# Patient Record
Sex: FEMALE | Race: WHITE | Employment: OTHER | URBAN - METROPOLITAN AREA
[De-identification: names, ages, dates, MRNs, and addresses within clinical notes are randomized per-mention and may not be internally consistent; named-entity substitution may affect disease eponyms.]

---

## 2017-05-17 ENCOUNTER — HOSPITAL ENCOUNTER (OUTPATIENT)
Age: 67
Setting detail: SPECIMEN
Discharge: HOME | End: 2017-05-17
Payer: MEDICARE

## 2017-05-17 ENCOUNTER — OFFICE VISIT (OUTPATIENT)
Dept: FAMILY MEDICINE CLINIC | Age: 67
End: 2017-05-17
Payer: MEDICARE

## 2017-05-17 VITALS
RESPIRATION RATE: 16 BRPM | DIASTOLIC BLOOD PRESSURE: 76 MMHG | HEIGHT: 63 IN | TEMPERATURE: 98.1 F | SYSTOLIC BLOOD PRESSURE: 114 MMHG | WEIGHT: 144 LBS | HEART RATE: 74 BPM | BODY MASS INDEX: 25.52 KG/M2

## 2017-05-17 DIAGNOSIS — Z12.12 SCREENING FOR COLORECTAL CANCER: ICD-10-CM

## 2017-05-17 DIAGNOSIS — E55.9 VITAMIN D DEFICIENCY: ICD-10-CM

## 2017-05-17 DIAGNOSIS — Z11.59 NEED FOR HEPATITIS C SCREENING TEST: ICD-10-CM

## 2017-05-17 DIAGNOSIS — Z85.3 HISTORY OF BREAST CANCER IN FEMALE: ICD-10-CM

## 2017-05-17 DIAGNOSIS — E03.9 HYPOTHYROIDISM, UNSPECIFIED TYPE: Chronic | ICD-10-CM

## 2017-05-17 DIAGNOSIS — R51.9 CHRONIC INTRACTABLE HEADACHE, UNSPECIFIED HEADACHE TYPE: Primary | ICD-10-CM

## 2017-05-17 DIAGNOSIS — E78.5 HYPERLIPIDEMIA, UNSPECIFIED HYPERLIPIDEMIA TYPE: Chronic | ICD-10-CM

## 2017-05-17 DIAGNOSIS — G89.29 CHRONIC INTRACTABLE HEADACHE, UNSPECIFIED HEADACHE TYPE: Primary | ICD-10-CM

## 2017-05-17 DIAGNOSIS — H53.9 VISUAL DISTURBANCE: ICD-10-CM

## 2017-05-17 DIAGNOSIS — G89.29 CHRONIC INTRACTABLE HEADACHE, UNSPECIFIED HEADACHE TYPE: ICD-10-CM

## 2017-05-17 DIAGNOSIS — H53.421 SCOTOMA OF BLIND SPOT AREA OF RIGHT EYE: ICD-10-CM

## 2017-05-17 DIAGNOSIS — Z72.0 TOBACCO ABUSE: ICD-10-CM

## 2017-05-17 DIAGNOSIS — R51.9 CHRONIC INTRACTABLE HEADACHE, UNSPECIFIED HEADACHE TYPE: ICD-10-CM

## 2017-05-17 DIAGNOSIS — Z12.11 SCREENING FOR COLORECTAL CANCER: ICD-10-CM

## 2017-05-17 LAB
ALBUMIN SERPL-MCNC: 4.4 G/DL (ref 3.5–5.2)
ALBUMIN/GLOBULIN RATIO: 1.4 (ref 1–2.5)
ALP BLD-CCNC: 117 U/L (ref 35–104)
ALT SERPL-CCNC: 15 U/L (ref 5–33)
ANION GAP SERPL CALCULATED.3IONS-SCNC: 14 MMOL/L (ref 9–17)
AST SERPL-CCNC: 16 U/L
BILIRUB SERPL-MCNC: 0.3 MG/DL (ref 0.3–1.2)
BUN BLDV-MCNC: 13 MG/DL (ref 8–23)
BUN/CREAT BLD: ABNORMAL (ref 9–20)
CALCIUM SERPL-MCNC: 9.6 MG/DL (ref 8.6–10.4)
CHLORIDE BLD-SCNC: 99 MMOL/L (ref 98–107)
CHOLESTEROL/HDL RATIO: 4
CHOLESTEROL: 168 MG/DL
CO2: 27 MMOL/L (ref 20–31)
CREAT SERPL-MCNC: 1.01 MG/DL (ref 0.5–0.9)
GFR AFRICAN AMERICAN: >60 ML/MIN
GFR NON-AFRICAN AMERICAN: 55 ML/MIN
GFR SERPL CREATININE-BSD FRML MDRD: ABNORMAL ML/MIN/{1.73_M2}
GFR SERPL CREATININE-BSD FRML MDRD: ABNORMAL ML/MIN/{1.73_M2}
GLUCOSE BLD-MCNC: 92 MG/DL (ref 70–99)
HCT VFR BLD CALC: 42.6 % (ref 36–46)
HDLC SERPL-MCNC: 42 MG/DL
HEMOGLOBIN: 14.4 G/DL (ref 12–16)
HEPATITIS C ANTIBODY: NONREACTIVE
LDL CHOLESTEROL: 97 MG/DL (ref 0–130)
MCH RBC QN AUTO: 32.1 PG (ref 26–34)
MCHC RBC AUTO-ENTMCNC: 33.7 G/DL (ref 31–37)
MCV RBC AUTO: 95.1 FL (ref 80–100)
PDW BLD-RTO: 13.9 % (ref 12.5–15.4)
PLATELET # BLD: 204 K/UL (ref 140–450)
PMV BLD AUTO: 9.8 FL (ref 6–12)
POTASSIUM SERPL-SCNC: 3.6 MMOL/L (ref 3.7–5.3)
RBC # BLD: 4.48 M/UL (ref 4–5.2)
SODIUM BLD-SCNC: 140 MMOL/L (ref 135–144)
TOTAL PROTEIN: 7.6 G/DL (ref 6.4–8.3)
TRIGL SERPL-MCNC: 145 MG/DL
TSH SERPL DL<=0.05 MIU/L-ACNC: 0.63 MIU/L (ref 0.3–5)
VLDLC SERPL CALC-MCNC: NORMAL MG/DL (ref 1–30)
WBC # BLD: 7.9 K/UL (ref 3.5–11)

## 2017-05-17 PROCEDURE — G8427 DOCREV CUR MEDS BY ELIG CLIN: HCPCS | Performed by: PEDIATRICS

## 2017-05-17 PROCEDURE — 3014F SCREEN MAMMO DOC REV: CPT | Performed by: PEDIATRICS

## 2017-05-17 PROCEDURE — 3017F COLORECTAL CA SCREEN DOC REV: CPT | Performed by: PEDIATRICS

## 2017-05-17 PROCEDURE — 4004F PT TOBACCO SCREEN RCVD TLK: CPT | Performed by: PEDIATRICS

## 2017-05-17 PROCEDURE — 1090F PRES/ABSN URINE INCON ASSESS: CPT | Performed by: PEDIATRICS

## 2017-05-17 PROCEDURE — G8420 CALC BMI NORM PARAMETERS: HCPCS | Performed by: PEDIATRICS

## 2017-05-17 PROCEDURE — 1123F ACP DISCUSS/DSCN MKR DOCD: CPT | Performed by: PEDIATRICS

## 2017-05-17 PROCEDURE — G8400 PT W/DXA NO RESULTS DOC: HCPCS | Performed by: PEDIATRICS

## 2017-05-17 PROCEDURE — 4040F PNEUMOC VAC/ADMIN/RCVD: CPT | Performed by: PEDIATRICS

## 2017-05-17 PROCEDURE — 99214 OFFICE O/P EST MOD 30 MIN: CPT | Performed by: PEDIATRICS

## 2017-05-17 ASSESSMENT — ENCOUNTER SYMPTOMS
BACK PAIN: 0
SWOLLEN GLANDS: 0
COLOR CHANGE: 0
VOMITING: 0
WHEEZING: 0
EYE PAIN: 0
DIARRHEA: 0
SINUS PRESSURE: 0
EYE REDNESS: 0
COUGH: 0
PHOTOPHOBIA: 0
FACIAL SWEATING: 0
CHOKING: 0
SORE THROAT: 0
ABDOMINAL PAIN: 0
NAUSEA: 0
SCALP TENDERNESS: 0
CONSTIPATION: 0
BLURRED VISION: 0
EYE WATERING: 0
VISUAL CHANGE: 0
STRIDOR: 0
RHINORRHEA: 0
SHORTNESS OF BREATH: 0

## 2017-05-18 DIAGNOSIS — E55.9 VITAMIN D DEFICIENCY: Primary | ICD-10-CM

## 2017-05-18 LAB — VITAMIN D 25-HYDROXY: 12.9 NG/ML (ref 30–100)

## 2017-05-19 ENCOUNTER — NURSE ONLY (OUTPATIENT)
Dept: FAMILY MEDICINE CLINIC | Age: 67
End: 2017-05-19
Payer: MEDICARE

## 2017-05-19 DIAGNOSIS — Z12.11 SCREENING FOR COLORECTAL CANCER: ICD-10-CM

## 2017-05-19 DIAGNOSIS — Z12.12 SCREENING FOR COLORECTAL CANCER: ICD-10-CM

## 2017-05-19 LAB
CONTROL: POSITIVE
HEMOCCULT STL QL: NEGATIVE

## 2017-05-19 PROCEDURE — 82274 ASSAY TEST FOR BLOOD FECAL: CPT | Performed by: PEDIATRICS

## 2017-05-25 DIAGNOSIS — H53.9 VISUAL DISTURBANCE: ICD-10-CM

## 2017-05-25 DIAGNOSIS — G89.29 CHRONIC INTRACTABLE HEADACHE, UNSPECIFIED HEADACHE TYPE: ICD-10-CM

## 2017-05-25 DIAGNOSIS — Z85.3 HISTORY OF BREAST CANCER IN FEMALE: ICD-10-CM

## 2017-05-25 DIAGNOSIS — R51.9 CHRONIC INTRACTABLE HEADACHE, UNSPECIFIED HEADACHE TYPE: ICD-10-CM

## 2017-05-30 RX ORDER — METHYLPREDNISOLONE 4 MG/1
TABLET ORAL
Qty: 21 TABLET | Refills: 0 | Status: SHIPPED | OUTPATIENT
Start: 2017-05-30 | End: 2017-06-04

## 2017-06-05 DIAGNOSIS — Z72.0 TOBACCO ABUSE: ICD-10-CM

## 2017-06-05 DIAGNOSIS — G89.29 CHRONIC INTRACTABLE HEADACHE, UNSPECIFIED HEADACHE TYPE: ICD-10-CM

## 2017-06-05 DIAGNOSIS — E78.5 HYPERLIPIDEMIA, UNSPECIFIED HYPERLIPIDEMIA TYPE: Chronic | ICD-10-CM

## 2017-06-05 DIAGNOSIS — H53.9 VISUAL DISTURBANCE: ICD-10-CM

## 2017-06-05 DIAGNOSIS — H53.421 SCOTOMA OF BLIND SPOT AREA OF RIGHT EYE: ICD-10-CM

## 2017-06-05 DIAGNOSIS — R51.9 CHRONIC INTRACTABLE HEADACHE, UNSPECIFIED HEADACHE TYPE: ICD-10-CM

## 2017-06-19 RX ORDER — SIMVASTATIN 40 MG
40 TABLET ORAL NIGHTLY
Qty: 90 TABLET | Refills: 1 | Status: SHIPPED | OUTPATIENT
Start: 2017-06-19 | End: 2018-07-05 | Stop reason: SDUPTHER

## 2017-06-19 RX ORDER — LEVOTHYROXINE SODIUM 112 UG/1
112 TABLET ORAL DAILY
Qty: 90 TABLET | Refills: 1 | Status: SHIPPED | OUTPATIENT
Start: 2017-06-19 | End: 2018-03-21 | Stop reason: SDUPTHER

## 2017-11-01 ENCOUNTER — OFFICE VISIT (OUTPATIENT)
Dept: FAMILY MEDICINE CLINIC | Age: 67
End: 2017-11-01
Payer: MEDICARE

## 2017-11-01 VITALS
HEIGHT: 63 IN | BODY MASS INDEX: 24.98 KG/M2 | OXYGEN SATURATION: 97 % | SYSTOLIC BLOOD PRESSURE: 120 MMHG | WEIGHT: 141 LBS | HEART RATE: 72 BPM | TEMPERATURE: 98.1 F | DIASTOLIC BLOOD PRESSURE: 62 MMHG | RESPIRATION RATE: 20 BRPM

## 2017-11-01 DIAGNOSIS — J40 BRONCHITIS: Primary | ICD-10-CM

## 2017-11-01 DIAGNOSIS — R05.9 COUGH: ICD-10-CM

## 2017-11-01 PROCEDURE — G8484 FLU IMMUNIZE NO ADMIN: HCPCS | Performed by: NURSE PRACTITIONER

## 2017-11-01 PROCEDURE — 4040F PNEUMOC VAC/ADMIN/RCVD: CPT | Performed by: NURSE PRACTITIONER

## 2017-11-01 PROCEDURE — 1123F ACP DISCUSS/DSCN MKR DOCD: CPT | Performed by: NURSE PRACTITIONER

## 2017-11-01 PROCEDURE — G8400 PT W/DXA NO RESULTS DOC: HCPCS | Performed by: NURSE PRACTITIONER

## 2017-11-01 PROCEDURE — 4004F PT TOBACCO SCREEN RCVD TLK: CPT | Performed by: NURSE PRACTITIONER

## 2017-11-01 PROCEDURE — G8417 CALC BMI ABV UP PARAM F/U: HCPCS | Performed by: NURSE PRACTITIONER

## 2017-11-01 PROCEDURE — G8427 DOCREV CUR MEDS BY ELIG CLIN: HCPCS | Performed by: NURSE PRACTITIONER

## 2017-11-01 PROCEDURE — 99214 OFFICE O/P EST MOD 30 MIN: CPT | Performed by: NURSE PRACTITIONER

## 2017-11-01 PROCEDURE — 1090F PRES/ABSN URINE INCON ASSESS: CPT | Performed by: NURSE PRACTITIONER

## 2017-11-01 PROCEDURE — 3017F COLORECTAL CA SCREEN DOC REV: CPT | Performed by: NURSE PRACTITIONER

## 2017-11-01 PROCEDURE — 3014F SCREEN MAMMO DOC REV: CPT | Performed by: NURSE PRACTITIONER

## 2017-11-01 RX ORDER — AZITHROMYCIN 250 MG/1
TABLET, FILM COATED ORAL
Qty: 6 TABLET | Refills: 0 | Status: SHIPPED | OUTPATIENT
Start: 2017-11-01 | End: 2017-11-11

## 2017-11-01 RX ORDER — ALBUTEROL SULFATE 90 UG/1
2 AEROSOL, METERED RESPIRATORY (INHALATION) EVERY 4 HOURS PRN
Qty: 1 INHALER | Refills: 1 | Status: SHIPPED | OUTPATIENT
Start: 2017-11-01 | End: 2018-11-08 | Stop reason: SDUPTHER

## 2017-11-01 RX ORDER — ALBUTEROL SULFATE 2.5 MG/3ML
2.5 SOLUTION RESPIRATORY (INHALATION) ONCE
Status: COMPLETED | OUTPATIENT
Start: 2017-11-01 | End: 2017-11-01

## 2017-11-01 RX ORDER — PREDNISONE 20 MG/1
40 TABLET ORAL DAILY
Qty: 10 TABLET | Refills: 0 | Status: SHIPPED | OUTPATIENT
Start: 2017-11-01 | End: 2017-11-06

## 2017-11-01 RX ORDER — IPRATROPIUM BROMIDE AND ALBUTEROL SULFATE 2.5; .5 MG/3ML; MG/3ML
1 SOLUTION RESPIRATORY (INHALATION) ONCE
Status: DISCONTINUED | OUTPATIENT
Start: 2017-11-01 | End: 2017-11-01

## 2017-11-01 RX ADMIN — ALBUTEROL SULFATE 2.5 MG: 2.5 SOLUTION RESPIRATORY (INHALATION) at 15:19

## 2017-11-01 RX ADMIN — Medication 0.5 MG: at 15:20

## 2017-11-01 NOTE — PROGRESS NOTES
Subjective:      Patient ID: Joyce Moran is a 75 y.o. female. Visit Information    Have you changed or started any medications since your last visit including any over-the-counter medicines, vitamins, or herbal medicines? no   Have you stopped taking any of your medications? Is so, why? -  no  Are you having any side effects from any of your medications? - no    Have you seen any other physician or provider since your last visit?  no   Have you had any other diagnostic tests since your last visit?  no   Have you been seen in the emergency room and/or had an admission in a hospital since we last saw you?  no   Have you had your routine dental cleaning in the past 6 months?  no     Do you have an active MyChart account? If no, what is the barrier? Yes    Patient Care Team:  Froilan Gann MD as PCP - General (Pediatrics)  Froilan Gann MD as PCP - S Attributed Provider    Medical History Review  Past Medical, Family, and Social History reviewed and does contribute to the patient presenting condition    Health Maintenance   Topic Date Due    Flu vaccine (1) 09/01/2017    Breast cancer screen  02/03/2018    Colon Cancer Screen FIT/FOBT  05/19/2018    Lipid screen  05/17/2022    Zostavax vaccine  Addressed    DEXA (modify frequency per FRAX score)  Addressed    Pneumococcal low/med risk  Completed    Hepatitis C screen  Completed       Yazmin Soto presents to the office today with her  with concerns of difficulty breathing. Getting worse over the past week. Chest feels tight. Feels like she can't get a good breath. Denies fever. Difficulty coughing up mucus. But feels like it is there. Has tried OTC medication with no relief. URI    This is a new problem. The current episode started in the past 7 days. The problem has been rapidly worsening. Associated symptoms include coughing, rhinorrhea, a sore throat and wheezing.  Pertinent negatives include no chest pain, diarrhea, headaches, nausea or rash. Review of Systems   Constitutional: Positive for appetite change, chills, diaphoresis and fatigue. Negative for fever. HENT: Positive for postnasal drip, rhinorrhea and sore throat. Respiratory: Positive for cough, chest tightness, shortness of breath and wheezing. Cardiovascular: Negative for chest pain. Gastrointestinal: Negative for diarrhea and nausea. Musculoskeletal: Negative for myalgias. Skin: Negative for rash. Neurological: Negative for dizziness and headaches. Objective:   Physical Exam   Constitutional: She is oriented to person, place, and time. She appears well-developed. No distress. HENT:   Head: Normocephalic and atraumatic. Right Ear: Tympanic membrane, external ear and ear canal normal.   Left Ear: Tympanic membrane, external ear and ear canal normal.   Nose: Mucosal edema present. Right sinus exhibits no maxillary sinus tenderness and no frontal sinus tenderness. Left sinus exhibits no maxillary sinus tenderness and no frontal sinus tenderness. Mouth/Throat: Mucous membranes are normal. Posterior oropharyngeal erythema present. No oropharyngeal exudate. Eyes: Conjunctivae and EOM are normal. Pupils are equal, round, and reactive to light. Right eye exhibits no discharge. Left eye exhibits no discharge. No scleral icterus. Neck: Normal range of motion. No thyromegaly present. Cardiovascular: Normal rate, regular rhythm and normal heart sounds. No murmur heard. Pulses:       Radial pulses are 2+ on the right side, and 2+ on the left side. Pulmonary/Chest: Effort normal. No respiratory distress. She has decreased breath sounds. She has wheezes. She has no rhonchi. She has no rales. Breath sounds improved post breathing treatment. Abdominal: Soft. Bowel sounds are normal. She exhibits no distension. There is no tenderness. Musculoskeletal: She exhibits no edema. Lymphadenopathy:     She has no cervical adenopathy. Depression Severity: 1-4 = Minimal depression, 5-9 = Mild depression, 10-14 = Moderate depression, 15-19 = Moderately severe depression, 20-27 = Severe depression  Normal

## 2017-11-11 ASSESSMENT — ENCOUNTER SYMPTOMS
DIARRHEA: 0
COUGH: 1
NAUSEA: 0
SORE THROAT: 1
RHINORRHEA: 1
SHORTNESS OF BREATH: 1
CHEST TIGHTNESS: 1
WHEEZING: 1

## 2018-03-21 ENCOUNTER — TELEPHONE (OUTPATIENT)
Dept: FAMILY MEDICINE CLINIC | Age: 68
End: 2018-03-21

## 2018-03-21 DIAGNOSIS — Z90.12 HISTORY OF LEFT MASTECTOMY: ICD-10-CM

## 2018-03-21 DIAGNOSIS — Z85.3 HISTORY OF BREAST CANCER IN FEMALE: Primary | ICD-10-CM

## 2018-03-21 RX ORDER — LEVOTHYROXINE SODIUM 112 UG/1
112 TABLET ORAL DAILY
Qty: 90 TABLET | Refills: 1 | Status: SHIPPED | OUTPATIENT
Start: 2018-03-21 | End: 2018-11-08 | Stop reason: SDUPTHER

## 2018-03-28 DIAGNOSIS — Z85.3 HISTORY OF BREAST CANCER IN FEMALE: ICD-10-CM

## 2018-03-28 DIAGNOSIS — Z90.12 HISTORY OF LEFT MASTECTOMY: ICD-10-CM

## 2018-04-25 ENCOUNTER — HOSPITAL ENCOUNTER (OUTPATIENT)
Age: 68
Setting detail: SPECIMEN
Discharge: HOME | End: 2018-04-25
Payer: MEDICARE

## 2018-04-25 ENCOUNTER — OFFICE VISIT (OUTPATIENT)
Dept: FAMILY MEDICINE CLINIC | Age: 68
End: 2018-04-25
Payer: MEDICARE

## 2018-04-25 VITALS
SYSTOLIC BLOOD PRESSURE: 118 MMHG | BODY MASS INDEX: 26.64 KG/M2 | TEMPERATURE: 97.6 F | WEIGHT: 148 LBS | RESPIRATION RATE: 16 BRPM | DIASTOLIC BLOOD PRESSURE: 76 MMHG

## 2018-04-25 DIAGNOSIS — Z85.3 HISTORY OF BREAST CANCER IN FEMALE: ICD-10-CM

## 2018-04-25 DIAGNOSIS — R31.29 MICROHEMATURIA: ICD-10-CM

## 2018-04-25 DIAGNOSIS — E78.5 HYPERLIPIDEMIA, UNSPECIFIED HYPERLIPIDEMIA TYPE: Chronic | ICD-10-CM

## 2018-04-25 DIAGNOSIS — Z72.0 TOBACCO ABUSE: ICD-10-CM

## 2018-04-25 DIAGNOSIS — Z90.12 HISTORY OF LEFT MASTECTOMY: ICD-10-CM

## 2018-04-25 DIAGNOSIS — R01.1 HEART MURMUR: ICD-10-CM

## 2018-04-25 DIAGNOSIS — M81.0 AGE-RELATED OSTEOPOROSIS WITHOUT CURRENT PATHOLOGICAL FRACTURE: ICD-10-CM

## 2018-04-25 DIAGNOSIS — Z12.89 ENCOUNTER FOR PELVIC SCREENING FOR MALIGNANT NEOPLASM: ICD-10-CM

## 2018-04-25 DIAGNOSIS — E03.9 HYPOTHYROIDISM, UNSPECIFIED TYPE: Chronic | ICD-10-CM

## 2018-04-25 DIAGNOSIS — E55.9 VITAMIN D DEFICIENCY: ICD-10-CM

## 2018-04-25 DIAGNOSIS — Z01.419 ENCOUNTER FOR CERVICAL PAP SMEAR WITH PELVIC EXAM: Primary | ICD-10-CM

## 2018-04-25 PROCEDURE — 1123F ACP DISCUSS/DSCN MKR DOCD: CPT | Performed by: PEDIATRICS

## 2018-04-25 PROCEDURE — 1090F PRES/ABSN URINE INCON ASSESS: CPT | Performed by: PEDIATRICS

## 2018-04-25 PROCEDURE — G8427 DOCREV CUR MEDS BY ELIG CLIN: HCPCS | Performed by: PEDIATRICS

## 2018-04-25 PROCEDURE — 4040F PNEUMOC VAC/ADMIN/RCVD: CPT | Performed by: PEDIATRICS

## 2018-04-25 PROCEDURE — G8400 PT W/DXA NO RESULTS DOC: HCPCS | Performed by: PEDIATRICS

## 2018-04-25 PROCEDURE — G0101 CA SCREEN;PELVIC/BREAST EXAM: HCPCS | Performed by: PEDIATRICS

## 2018-04-25 PROCEDURE — G8417 CALC BMI ABV UP PARAM F/U: HCPCS | Performed by: PEDIATRICS

## 2018-04-25 PROCEDURE — 3014F SCREEN MAMMO DOC REV: CPT | Performed by: PEDIATRICS

## 2018-04-25 PROCEDURE — 3017F COLORECTAL CA SCREEN DOC REV: CPT | Performed by: PEDIATRICS

## 2018-04-25 PROCEDURE — 99214 OFFICE O/P EST MOD 30 MIN: CPT | Performed by: PEDIATRICS

## 2018-04-25 PROCEDURE — 4004F PT TOBACCO SCREEN RCVD TLK: CPT | Performed by: PEDIATRICS

## 2018-04-25 ASSESSMENT — ENCOUNTER SYMPTOMS
SORE THROAT: 0
COUGH: 0
CONSTIPATION: 0
RHINORRHEA: 0
NAUSEA: 0
BACK PAIN: 0
CHEST TIGHTNESS: 0
ABDOMINAL PAIN: 0
DIARRHEA: 0
TROUBLE SWALLOWING: 0
WHEEZING: 0
SINUS PRESSURE: 0
EYE PAIN: 0
VOMITING: 0
EYE REDNESS: 0
BLOOD IN STOOL: 0
EYE DISCHARGE: 0
COLOR CHANGE: 0
SHORTNESS OF BREATH: 0

## 2018-04-25 ASSESSMENT — PATIENT HEALTH QUESTIONNAIRE - PHQ9
1. LITTLE INTEREST OR PLEASURE IN DOING THINGS: 0
SUM OF ALL RESPONSES TO PHQ9 QUESTIONS 1 & 2: 0
SUM OF ALL RESPONSES TO PHQ QUESTIONS 1-9: 0
2. FEELING DOWN, DEPRESSED OR HOPELESS: 0

## 2018-05-11 DIAGNOSIS — R01.1 HEART MURMUR: ICD-10-CM

## 2018-05-20 LAB — CYTOLOGY REPORT: NORMAL

## 2018-06-12 ENCOUNTER — HOSPITAL ENCOUNTER (OUTPATIENT)
Age: 68
Setting detail: SPECIMEN
Discharge: HOME | End: 2018-06-12
Payer: MEDICARE

## 2018-06-12 DIAGNOSIS — E03.9 HYPOTHYROIDISM, UNSPECIFIED TYPE: Chronic | ICD-10-CM

## 2018-06-12 DIAGNOSIS — R73.09 ELEVATED GLUCOSE: ICD-10-CM

## 2018-06-12 DIAGNOSIS — E78.5 HYPERLIPIDEMIA, UNSPECIFIED HYPERLIPIDEMIA TYPE: Chronic | ICD-10-CM

## 2018-06-12 DIAGNOSIS — R73.09 ELEVATED GLUCOSE: Primary | ICD-10-CM

## 2018-06-12 DIAGNOSIS — E55.9 VITAMIN D DEFICIENCY: ICD-10-CM

## 2018-06-12 LAB
ALBUMIN SERPL-MCNC: 4.4 G/DL (ref 3.5–5.2)
ALBUMIN/GLOBULIN RATIO: 1.8 (ref 1–2.5)
ALP BLD-CCNC: 109 U/L (ref 35–104)
ALT SERPL-CCNC: 25 U/L (ref 5–33)
ANION GAP SERPL CALCULATED.3IONS-SCNC: 13 MMOL/L (ref 9–17)
AST SERPL-CCNC: 22 U/L
BILIRUB SERPL-MCNC: 0.37 MG/DL (ref 0.3–1.2)
BUN BLDV-MCNC: 9 MG/DL (ref 8–23)
BUN/CREAT BLD: ABNORMAL (ref 9–20)
CALCIUM SERPL-MCNC: 8.9 MG/DL (ref 8.6–10.4)
CHLORIDE BLD-SCNC: 104 MMOL/L (ref 98–107)
CHOLESTEROL/HDL RATIO: 3.9
CHOLESTEROL: 177 MG/DL
CO2: 26 MMOL/L (ref 20–31)
CREAT SERPL-MCNC: 0.96 MG/DL (ref 0.5–0.9)
GFR AFRICAN AMERICAN: >60 ML/MIN
GFR NON-AFRICAN AMERICAN: 58 ML/MIN
GFR SERPL CREATININE-BSD FRML MDRD: ABNORMAL ML/MIN/{1.73_M2}
GFR SERPL CREATININE-BSD FRML MDRD: ABNORMAL ML/MIN/{1.73_M2}
GLUCOSE BLD-MCNC: 102 MG/DL (ref 70–99)
HCT VFR BLD CALC: 43.9 % (ref 36.3–47.1)
HDLC SERPL-MCNC: 45 MG/DL
HEMOGLOBIN: 14.1 G/DL (ref 11.9–15.1)
LDL CHOLESTEROL: 99 MG/DL (ref 0–130)
MCH RBC QN AUTO: 31.3 PG (ref 25.2–33.5)
MCHC RBC AUTO-ENTMCNC: 32.1 G/DL (ref 28.4–34.8)
MCV RBC AUTO: 97.6 FL (ref 82.6–102.9)
NRBC AUTOMATED: 0 PER 100 WBC
PDW BLD-RTO: 13.2 % (ref 11.8–14.4)
PLATELET # BLD: 207 K/UL (ref 138–453)
PMV BLD AUTO: 11.2 FL (ref 8.1–13.5)
POTASSIUM SERPL-SCNC: 4.3 MMOL/L (ref 3.7–5.3)
RBC # BLD: 4.5 M/UL (ref 3.95–5.11)
SODIUM BLD-SCNC: 143 MMOL/L (ref 135–144)
TOTAL PROTEIN: 6.8 G/DL (ref 6.4–8.3)
TRIGL SERPL-MCNC: 165 MG/DL
TSH SERPL DL<=0.05 MIU/L-ACNC: 5.28 MIU/L (ref 0.3–5)
VITAMIN D 25-HYDROXY: 30.5 NG/ML (ref 30–100)
VLDLC SERPL CALC-MCNC: ABNORMAL MG/DL (ref 1–30)
WBC # BLD: 7.1 K/UL (ref 3.5–11.3)

## 2018-06-13 ENCOUNTER — HOSPITAL ENCOUNTER (OUTPATIENT)
Age: 68
Setting detail: SPECIMEN
Discharge: HOME | End: 2018-06-13
Payer: MEDICARE

## 2018-06-13 DIAGNOSIS — E78.5 HYPERLIPIDEMIA, UNSPECIFIED HYPERLIPIDEMIA TYPE: Chronic | ICD-10-CM

## 2018-06-13 DIAGNOSIS — E03.9 HYPOTHYROIDISM, UNSPECIFIED TYPE: Chronic | ICD-10-CM

## 2018-06-13 DIAGNOSIS — Z12.89 ENCOUNTER FOR PELVIC SCREENING FOR MALIGNANT NEOPLASM: ICD-10-CM

## 2018-06-13 LAB
BILIRUBIN URINE: NEGATIVE
COLOR: YELLOW
COMMENT UA: NORMAL
CONTROL: PRESENT
ESTIMATED AVERAGE GLUCOSE: 117 MG/DL
GLUCOSE URINE: NEGATIVE
HBA1C MFR BLD: 5.7 % (ref 4–6)
HEMOCCULT STL QL: NEGATIVE
KETONES, URINE: NEGATIVE
LEUKOCYTE ESTERASE, URINE: NEGATIVE
NITRITE, URINE: NEGATIVE
PH UA: 7.5 (ref 5–8)
PROTEIN UA: NEGATIVE
SPECIFIC GRAVITY UA: 1.02 (ref 1–1.03)
TURBIDITY: CLEAR
URINE HGB: NEGATIVE
UROBILINOGEN, URINE: NORMAL

## 2018-07-05 RX ORDER — SIMVASTATIN 40 MG
40 TABLET ORAL NIGHTLY
Qty: 90 TABLET | Refills: 1 | Status: SHIPPED | OUTPATIENT
Start: 2018-07-05 | End: 2019-06-27 | Stop reason: SDUPTHER

## 2018-07-05 NOTE — TELEPHONE ENCOUNTER
LOV 4-25-18  RTO 1 year post  [facility] 6-19-17      Health Maintenance   Topic Date Due    Shingles Vaccine (1 of 2 - 2 Dose Series) 10/18/1999    Flu vaccine (1) 09/01/2018    A1C test (Diabetic or Prediabetic)  06/12/2019    TSH testing  06/12/2019    Colon Cancer Screen FIT/FOBT  06/13/2019    Breast cancer screen  03/27/2020    Lipid screen  06/12/2023    DEXA (modify frequency per FRAX score)  Addressed    Pneumococcal low/med risk  Completed    Hepatitis C screen  Completed             (applicable per patient's age: Cancer Screenings, Depression Screening, Fall Risk Screening, Immunizations)    Hemoglobin A1C (%)   Date Value   06/12/2018 5.7     LDL Cholesterol (mg/dL)   Date Value   06/12/2018 99     AST (U/L)   Date Value   06/12/2018 22     ALT (U/L)   Date Value   06/12/2018 25     BUN (mg/dL)   Date Value   06/12/2018 9      (goal A1C is < 7)   (goal LDL is <100) need 30-50% reduction from baseline     BP Readings from Last 3 Encounters:   04/25/18 118/76   11/01/17 120/62   05/17/17 114/76    (goal /80)      All Future Testing planned in [facility]:  Lab Frequency Next Occurrence   PAP Smear Once 04/25/2018       Next Visit Date:  No future appointments.          Patient Active Problem List:     History of breast cancer in female     History of left mastectomy     Tobacco abuse     Hypothyroid     Hyperlipidemia     Microhematuria     Vitamin D deficiency

## 2018-11-08 DIAGNOSIS — J40 BRONCHITIS: ICD-10-CM

## 2018-11-08 RX ORDER — LEVOTHYROXINE SODIUM 112 UG/1
112 TABLET ORAL DAILY
Qty: 90 TABLET | Refills: 1 | Status: SHIPPED | OUTPATIENT
Start: 2018-11-08 | End: 2019-06-28 | Stop reason: DRUGHIGH

## 2018-11-08 RX ORDER — ALBUTEROL SULFATE 90 UG/1
2 AEROSOL, METERED RESPIRATORY (INHALATION) EVERY 4 HOURS PRN
Qty: 1 INHALER | Refills: 1 | Status: SHIPPED | OUTPATIENT
Start: 2018-11-08 | End: 2021-03-08

## 2018-11-08 NOTE — TELEPHONE ENCOUNTER
Last visit:4/25/2018  Last Med refill:3/21/2018    Next Visit Date:  Future Appointments  Date Time Provider Nicole Kelly   11/15/2018 4:20 PM [facility] [place] [facility] [address] Maintenance   Topic Date Due    Shingles Vaccine (1 of 2 - 2 Dose Series) 10/18/1999    Flu vaccine (1) 09/01/2018    Breast cancer screen  03/27/2019    A1C test (Diabetic or Prediabetic)  06/12/2019    TSH testing  06/12/2019    Colon Cancer Screen FIT/FOBT  06/13/2019    Lipid screen  06/12/2023    DEXA (modify frequency per FRAX score)  Addressed    Pneumococcal low/med risk  Completed    Hepatitis C screen  Completed       Hemoglobin A1C (%)   Date Value   06/12/2018 5.7             ( goal A1C is < 7)   No results found for: LABMICR  LDL Cholesterol (mg/dL)   Date Value   06/12/2018 99   05/17/2017 97       (goal LDL is <100)   AST (U/L)   Date Value   06/12/2018 22     ALT (U/L)   Date Value   06/12/2018 25     BUN (mg/dL)   Date Value   06/12/2018 9     BP Readings from Last 3 Encounters:   04/25/18 118/76   11/01/17 120/62   05/17/17 114/76          (goal 120/80)    All Future Testing planned in [facility]  Lab Frequency Next Occurrence   PAP Smear Once 04/25/2018               Patient Active Problem List:     History of breast cancer in female     History of left mastectomy     Tobacco abuse     Hypothyroid     Hyperlipidemia     Microhematuria     Vitamin D deficiency

## 2018-11-08 NOTE — TELEPHONE ENCOUNTER
From: Martha Barnes  Sent: 11/7/2018 7:57 AM EST  Subject: Medication Renewal Request    Vintia Jacobs would like a refill of the following medications:     albuterol sulfate HFA (VENTOLIN HFA) 108 (90 Base) MCG/ACT inhaler RUTH Luz - CNP]    Preferred pharmacy: [facility] [address] [facility] [phone] - F [phone]

## 2018-11-15 ENCOUNTER — OFFICE VISIT (OUTPATIENT)
Dept: FAMILY MEDICINE CLINIC | Age: 68
End: 2018-11-15
Payer: MEDICARE

## 2018-11-15 VITALS
SYSTOLIC BLOOD PRESSURE: 128 MMHG | HEART RATE: 72 BPM | RESPIRATION RATE: 16 BRPM | WEIGHT: 146 LBS | HEIGHT: 63 IN | TEMPERATURE: 98.2 F | BODY MASS INDEX: 25.87 KG/M2 | DIASTOLIC BLOOD PRESSURE: 66 MMHG

## 2018-11-15 DIAGNOSIS — E55.9 VITAMIN D DEFICIENCY: ICD-10-CM

## 2018-11-15 DIAGNOSIS — E03.9 HYPOTHYROIDISM, UNSPECIFIED TYPE: Chronic | ICD-10-CM

## 2018-11-15 DIAGNOSIS — J41.0 SIMPLE CHRONIC BRONCHITIS (HCC): ICD-10-CM

## 2018-11-15 DIAGNOSIS — Z72.0 TOBACCO USE: Primary | ICD-10-CM

## 2018-11-15 DIAGNOSIS — E78.5 HYPERLIPIDEMIA, UNSPECIFIED HYPERLIPIDEMIA TYPE: Chronic | ICD-10-CM

## 2018-11-15 PROCEDURE — 99214 OFFICE O/P EST MOD 30 MIN: CPT | Performed by: PEDIATRICS

## 2018-11-15 PROCEDURE — 1101F PT FALLS ASSESS-DOCD LE1/YR: CPT | Performed by: PEDIATRICS

## 2018-11-15 PROCEDURE — 3023F SPIROM DOC REV: CPT | Performed by: PEDIATRICS

## 2018-11-15 PROCEDURE — G8482 FLU IMMUNIZE ORDER/ADMIN: HCPCS | Performed by: PEDIATRICS

## 2018-11-15 PROCEDURE — G8427 DOCREV CUR MEDS BY ELIG CLIN: HCPCS | Performed by: PEDIATRICS

## 2018-11-15 PROCEDURE — G8400 PT W/DXA NO RESULTS DOC: HCPCS | Performed by: PEDIATRICS

## 2018-11-15 PROCEDURE — G8926 SPIRO NO PERF OR DOC: HCPCS | Performed by: PEDIATRICS

## 2018-11-15 PROCEDURE — 1090F PRES/ABSN URINE INCON ASSESS: CPT | Performed by: PEDIATRICS

## 2018-11-15 PROCEDURE — G8417 CALC BMI ABV UP PARAM F/U: HCPCS | Performed by: PEDIATRICS

## 2018-11-15 PROCEDURE — 4004F PT TOBACCO SCREEN RCVD TLK: CPT | Performed by: PEDIATRICS

## 2018-11-15 PROCEDURE — 3014F SCREEN MAMMO DOC REV: CPT | Performed by: PEDIATRICS

## 2018-11-15 PROCEDURE — 4040F PNEUMOC VAC/ADMIN/RCVD: CPT | Performed by: PEDIATRICS

## 2018-11-15 PROCEDURE — 1123F ACP DISCUSS/DSCN MKR DOCD: CPT | Performed by: PEDIATRICS

## 2018-11-15 PROCEDURE — 3017F COLORECTAL CA SCREEN DOC REV: CPT | Performed by: PEDIATRICS

## 2018-11-15 RX ORDER — NICOTINE 21 MG/24HR
1 PATCH, TRANSDERMAL 24 HOURS TRANSDERMAL EVERY 24 HOURS
Qty: 30 PATCH | Refills: 1 | Status: SHIPPED | OUTPATIENT
Start: 2018-11-15 | End: 2019-04-17 | Stop reason: ALTCHOICE

## 2018-11-15 RX ORDER — INFLUENZA VACCINE, ADJUVANTED 15; 15; 15 UG/.5ML; UG/.5ML; UG/.5ML
0.5 INJECTION, SUSPENSION INTRAMUSCULAR ONCE
Refills: 0 | COMMUNITY
Start: 2018-11-08 | End: 2019-04-17 | Stop reason: ALTCHOICE

## 2018-11-15 ASSESSMENT — ENCOUNTER SYMPTOMS
STRIDOR: 0
WHEEZING: 0
EYE REDNESS: 0
VOMITING: 0
COUGH: 1
DIARRHEA: 0
EYE DISCHARGE: 0
SHORTNESS OF BREATH: 0
COLOR CHANGE: 0
CONSTIPATION: 0
PHOTOPHOBIA: 0
ABDOMINAL PAIN: 0
EYE PAIN: 0

## 2018-11-15 NOTE — PROGRESS NOTES
Subjective:      Patient ID: Andrew Henry is a 70 y.o. female. Visit Information    Have you changed or started any medications since your last visit including any over-the-counter medicines, vitamins, or herbal medicines? no   Are you having any side effects from any of your medications? -  no  Have you stopped taking any of your medications? Is so, why? -  no    Have you seen any other physician or provider since your last visit? No  Have you had any other diagnostic tests since your last visit? No  Have you been seen in the emergency room and/or had an admission to a hospital since we last saw you? No  Have you had your routine dental cleaning in the past 6 months? no    Have you activated your [employer] account? If not, what are your barriers?  Yes     Patient Care Team:  Latosha Zhong MD as PCP - General (Pediatrics)  Latosha Zhong MD as PCP - S Attributed Provider    Medical History Review  Past Medical, Family, and Social History reviewed and does contribute to the patient presenting condition    Health Maintenance   Topic Date Due    Shingles Vaccine (1 of 2 - 2 Dose Series) 11/30/2018 (Originally 2/16/2015)    Breast cancer screen  03/27/2019    A1C test (Diabetic or Prediabetic)  06/12/2019    TSH testing  06/12/2019    Colon Cancer Screen FIT/FOBT  06/13/2019    Lipid screen  06/12/2023    DEXA (modify frequency per FRAX score)  Addressed    Flu vaccine  Completed    Pneumococcal low/med risk  Completed    Hepatitis C screen  Completed       PHQ Scores 4/25/2018 2/1/2016 1/23/2015   PHQ2 Score 0 0 0   PHQ9 Score 0 0 0     Interpretation of Total Score DepressionSeverity: 1-4 = Minimal depression, 5-9 = Mild depression, 10-14 = Moderate depression, 15-19 = Moderately severe depression, 20-27 = Severe depression    Current Outpatient Prescriptions   Medication Sig Dispense Refill    levothyroxine (SYNTHROID) 112 MCG tablet Take 1 tablet by mouth daily 90 tablet 1   

## 2018-11-18 PROBLEM — J41.0 SIMPLE CHRONIC BRONCHITIS (HCC): Status: ACTIVE | Noted: 2018-11-18

## 2019-04-17 ENCOUNTER — OFFICE VISIT (OUTPATIENT)
Dept: FAMILY MEDICINE CLINIC | Age: 69
End: 2019-04-17
Payer: MEDICARE

## 2019-04-17 VITALS
BODY MASS INDEX: 28.26 KG/M2 | DIASTOLIC BLOOD PRESSURE: 70 MMHG | RESPIRATION RATE: 20 BRPM | TEMPERATURE: 97.8 F | SYSTOLIC BLOOD PRESSURE: 116 MMHG | WEIGHT: 157 LBS | HEART RATE: 74 BPM

## 2019-04-17 DIAGNOSIS — R53.83 OTHER FATIGUE: ICD-10-CM

## 2019-04-17 DIAGNOSIS — R45.4 IRRITABLE MOOD: Primary | ICD-10-CM

## 2019-04-17 DIAGNOSIS — J41.0 SIMPLE CHRONIC BRONCHITIS (HCC): ICD-10-CM

## 2019-04-17 DIAGNOSIS — R06.02 SHORTNESS OF BREATH: ICD-10-CM

## 2019-04-17 DIAGNOSIS — I51.89 DIASTOLIC DYSFUNCTION: ICD-10-CM

## 2019-04-17 DIAGNOSIS — E78.5 HYPERLIPIDEMIA, UNSPECIFIED HYPERLIPIDEMIA TYPE: ICD-10-CM

## 2019-04-17 DIAGNOSIS — I20.8 ANGINAL EQUIVALENT (HCC): ICD-10-CM

## 2019-04-17 DIAGNOSIS — E55.9 VITAMIN D DEFICIENCY: ICD-10-CM

## 2019-04-17 DIAGNOSIS — R31.29 MICROHEMATURIA: ICD-10-CM

## 2019-04-17 DIAGNOSIS — E03.9 HYPOTHYROIDISM, UNSPECIFIED TYPE: ICD-10-CM

## 2019-04-17 DIAGNOSIS — M81.0 AGE-RELATED OSTEOPOROSIS WITHOUT CURRENT PATHOLOGICAL FRACTURE: ICD-10-CM

## 2019-04-17 DIAGNOSIS — R01.1 HEART MURMUR: ICD-10-CM

## 2019-04-17 PROCEDURE — 3017F COLORECTAL CA SCREEN DOC REV: CPT | Performed by: PEDIATRICS

## 2019-04-17 PROCEDURE — 1090F PRES/ABSN URINE INCON ASSESS: CPT | Performed by: PEDIATRICS

## 2019-04-17 PROCEDURE — G8417 CALC BMI ABV UP PARAM F/U: HCPCS | Performed by: PEDIATRICS

## 2019-04-17 PROCEDURE — G8427 DOCREV CUR MEDS BY ELIG CLIN: HCPCS | Performed by: PEDIATRICS

## 2019-04-17 PROCEDURE — 4040F PNEUMOC VAC/ADMIN/RCVD: CPT | Performed by: PEDIATRICS

## 2019-04-17 PROCEDURE — 3014F SCREEN MAMMO DOC REV: CPT | Performed by: PEDIATRICS

## 2019-04-17 PROCEDURE — G0444 DEPRESSION SCREEN ANNUAL: HCPCS | Performed by: PEDIATRICS

## 2019-04-17 PROCEDURE — 3023F SPIROM DOC REV: CPT | Performed by: PEDIATRICS

## 2019-04-17 PROCEDURE — G8926 SPIRO NO PERF OR DOC: HCPCS | Performed by: PEDIATRICS

## 2019-04-17 PROCEDURE — 1123F ACP DISCUSS/DSCN MKR DOCD: CPT | Performed by: PEDIATRICS

## 2019-04-17 PROCEDURE — G8598 ASA/ANTIPLAT THER USED: HCPCS | Performed by: PEDIATRICS

## 2019-04-17 PROCEDURE — 99214 OFFICE O/P EST MOD 30 MIN: CPT | Performed by: PEDIATRICS

## 2019-04-17 PROCEDURE — G8400 PT W/DXA NO RESULTS DOC: HCPCS | Performed by: PEDIATRICS

## 2019-04-17 PROCEDURE — 1036F TOBACCO NON-USER: CPT | Performed by: PEDIATRICS

## 2019-04-17 RX ORDER — SERTRALINE HYDROCHLORIDE 25 MG/1
25 TABLET, FILM COATED ORAL DAILY
Qty: 30 TABLET | Refills: 2 | Status: SHIPPED | OUTPATIENT
Start: 2019-04-17 | End: 2020-05-11 | Stop reason: ALTCHOICE

## 2019-04-17 ASSESSMENT — ENCOUNTER SYMPTOMS
BLOOD IN STOOL: 0
EYE REDNESS: 0
ABDOMINAL DISTENTION: 1
BACK PAIN: 0
ABDOMINAL PAIN: 0
DIARRHEA: 0
SINUS PRESSURE: 0
VOMITING: 0
COLOR CHANGE: 0
EYE PAIN: 0
CONSTIPATION: 0
COUGH: 0
NAUSEA: 0
RHINORRHEA: 0
EYE DISCHARGE: 0
WHEEZING: 0
TROUBLE SWALLOWING: 0
SORE THROAT: 0

## 2019-04-17 ASSESSMENT — PATIENT HEALTH QUESTIONNAIRE - PHQ9
1. LITTLE INTEREST OR PLEASURE IN DOING THINGS: 1
SUM OF ALL RESPONSES TO PHQ QUESTIONS 1-9: 5
10. IF YOU CHECKED OFF ANY PROBLEMS, HOW DIFFICULT HAVE THESE PROBLEMS MADE IT FOR YOU TO DO YOUR WORK, TAKE CARE OF THINGS AT HOME, OR GET ALONG WITH OTHER PEOPLE: 1
4. FEELING TIRED OR HAVING LITTLE ENERGY: 1
8. MOVING OR SPEAKING SO SLOWLY THAT OTHER PEOPLE COULD HAVE NOTICED. OR THE OPPOSITE, BEING SO FIGETY OR RESTLESS THAT YOU HAVE BEEN MOVING AROUND A LOT MORE THAN USUAL: 0
SUM OF ALL RESPONSES TO PHQ9 QUESTIONS 1 & 2: 3
7. TROUBLE CONCENTRATING ON THINGS, SUCH AS READING THE NEWSPAPER OR WATCHING TELEVISION: 1
9. THOUGHTS THAT YOU WOULD BE BETTER OFF DEAD, OR OF HURTING YOURSELF: 0
SUM OF ALL RESPONSES TO PHQ QUESTIONS 1-9: 5
3. TROUBLE FALLING OR STAYING ASLEEP: 0
2. FEELING DOWN, DEPRESSED OR HOPELESS: 2
5. POOR APPETITE OR OVEREATING: 0

## 2019-04-17 NOTE — PROGRESS NOTES
Subjective:      Patient ID: Sp Esqueda is a 70 y.o. female. Visit Information    Have you changed or started any medications since your last visit including any over-the-counter medicines, vitamins, or herbal medicines? no   Are you having any side effects from any of your medications? -  no  Have you stopped taking any of your medications? Is so, why? -  no    Have you seen any other physician or provider since your last visit? No  Have you had any other diagnostic tests since your last visit? No  Have you been seen in the emergency room and/or had an admission to a hospital since we last saw you? No  Have you had your routine dental cleaning in the past 6 months? no    Have you activated your [employer] account? If not, what are your barriers?  Yes     Patient Care Team:  Emily Holliday MD as PCP - General (Pediatrics)  Emily Holliday MD as PCP - S Attributed Provider    Medical History Review  Past Medical, Family, and Social History reviewed and does contribute to the patient presenting condition    Health Maintenance   Topic Date Due    Shingles Vaccine (3 of 3) 04/20/2019    Breast cancer screen  03/27/2019    A1C test (Diabetic or Prediabetic)  06/12/2019    TSH testing  06/12/2019    Colon Cancer Screen FIT/FOBT  06/13/2019    Lipid screen  06/12/2023    Flu vaccine  Completed    Pneumococcal 65+ years Vaccine  Completed    Hepatitis C screen  Completed    DEXA (modify frequency per FRAX score)  Addressed       PHQ Scores 4/17/2019 4/25/2018 2/1/2016 1/23/2015   PHQ2 Score 3 0 0 0   PHQ9 Score 5 0 0 0     Interpretation of Total Score DepressionSeverity: 1-4 = Minimal depression, 5-9 = Mild depression, 10-14 = Moderate depression, 15-19 = Moderately severe depression, 20-27 = Severe depression    Current Outpatient Medications   Medication Sig Dispense Refill    sertraline (ZOLOFT) 25 MG tablet Take 1 tablet by mouth daily 30 tablet 2    levothyroxine (SYNTHROID) 112 Musculoskeletal: Negative for arthralgias, back pain and myalgias. Skin: Negative for color change and rash. Allergic/Immunologic: Negative for immunocompromised state. Neurological: Negative for dizziness, focal weakness, syncope, weakness, light-headedness and headaches. Hematological: Negative for adenopathy. Psychiatric/Behavioral: Positive for agitation (very irritable since stopping smoking). Negative for behavioral problems, confusion, dysphoric mood and sleep disturbance. The patient is not nervous/anxious. Objective:     /70 (Site: Right Upper Arm, Position: Sitting, Cuff Size: Medium Adult)   Pulse 74   Temp 97.8 °F (36.6 °C) (Tympanic)   Resp 20   Wt 157 lb (71.2 kg)   Breastfeeding? No   BMI 28.26 kg/m²        Physical Exam   Constitutional: She is oriented to person, place, and time. She appears well-developed and well-nourished. No distress. HENT:   Head: Normocephalic. Right Ear: External ear normal. No middle ear effusion. Left Ear: External ear normal.  No middle ear effusion. Nose: Mucosal edema present. Mouth/Throat: Uvula is midline, oropharynx is clear and moist and mucous membranes are normal. No oropharyngeal exudate, posterior oropharyngeal edema or posterior oropharyngeal erythema. Eyes: Pupils are equal, round, and reactive to light. Conjunctivae and EOM are normal. Right eye exhibits no discharge. Left eye exhibits no discharge. No scleral icterus. Neck: Normal range of motion. Neck supple. No JVD present. No thyromegaly present. Cardiovascular: Normal rate and regular rhythm. Murmur heard. Systolic murmur is present with a grade of 2/6. Pulmonary/Chest: Effort normal. No stridor. No respiratory distress. She has no decreased breath sounds. She has no wheezes. She has no rhonchi. She has no rales. Abdominal: Soft. Bowel sounds are normal. She exhibits no mass. There is no tenderness.  Hernia confirmed negative in the right inguinal area and confirmed negative in the left inguinal area. Genitourinary: Vagina normal and uterus normal. Pelvic exam was performed with patient supine. There is no rash, tenderness or lesion on the right labia. There is no rash, tenderness or lesion on the left labia. Cervix exhibits no motion tenderness, no discharge and no friability. Right adnexum displays no mass, no tenderness and no fullness. Left adnexum displays no mass, no tenderness and no fullness. Musculoskeletal: Normal range of motion. She exhibits no edema or tenderness. Lymphadenopathy:     She has no cervical adenopathy. Right: No inguinal adenopathy present. Left: No inguinal adenopathy present. Neurological: She is alert and oriented to person, place, and time. She has normal reflexes. No cranial nerve deficit. She exhibits normal muscle tone. Coordination normal.   Skin: Skin is warm. No rash noted. She is not diaphoretic. No erythema. No pallor. Psychiatric: Her speech is normal and behavior is normal. Judgment and thought content normal. Her mood appears not anxious. Cognition and memory are normal. She does not exhibit a depressed mood. Nursing note and vitals reviewed. Assessment:      Diagnosis Orders   1. Irritable mood  sertraline (ZOLOFT) 25 MG tablet   2. Hypothyroidism, unspecified type     3. Hyperlipidemia, unspecified hyperlipidemia type     4. Age-related osteoporosis without current pathological fracture     5. Vitamin D deficiency     6. Microhematuria     7. Shortness of breath  (Gxt) Stress Test Exercise W Out Myoview   8. Simple chronic bronchitis ([address]     9. Anginal equivalent (HCC)  (Gxt) Stress Test Exercise W Out Myoview   10. Heart murmur  (Gxt) Stress Test Exercise W Out Myoview   11. Diastolic dysfunction  (Gxt) Stress Test Exercise W Out Myoview   12.  Other fatigue  (Gxt) Stress Test Exercise W Out Myoview        Plan:     Start zoloft as prescribed   Consider counseling   Recommend continue current medications  Daily exercise / healthy diet   Weight reduction   Recommend obtain dexascan - patient declines  Recommend calcium and vitamin D supplementation  Obtain stress test   Consider pulmonary function testing  Shingles vaccine recommended  Advise call with concerns        Dez received counseling on the following healthy behaviors: nutrition, exercise and medication adherence  Reviewed prior labs and health maintenance  Continue current medications, diet and exercise. Discussed use, benefit, and side effects of prescribed medications. Barriers to medication compliance addressed. Patient given educational materials - see patient instructions  Was a self-tracking handout given in paper form or via [employer]? No    Requested Prescriptions     Signed Prescriptions Disp Refills    sertraline (ZOLOFT) 25 MG tablet 30 tablet 2     Sig: Take 1 tablet by mouth daily       All patient questions answered. Patient voiced understanding. Quality Measures    Body mass index is 28.26 kg/m². Elevated. Weight control planned discussed Healthy diet and regular exercise. BP: 116/70. Blood pressure is normal. Treatment plan consists of No treatment change needed. Fall Risk 4/25/2018 2/1/2016 1/23/2015   2 or more falls in past year? no no no   Fall with injury in past year? no no no     The patient does not have a history of falls. I did not - not indicated , complete a risk assessment for falls.  A plan of care for falls No Treatment plan indicated    Lab Results   Component Value Date    LDLCHOLESTEROL 99 06/12/2018    (goal LDL reduction with dx if diabetes is 50% LDL reduction)    PHQ Scores 4/17/2019 4/25/2018 2/1/2016 1/23/2015   PHQ2 Score 3 0 0 0   PHQ9 Score 5 0 0 0     Interpretation of Total Score Depression Severity: 1-4 = Minimal depression, 5-9 = Mild depression, 10-14 = Moderate depression, 15-19 = Moderately severe depression, 20-27 = Severe depression      Quality & Risk Score Accuracy Visit Dx:  J41.0 - Simple chronic bronchitis ([facility] [address]  Assessment and plan:  Stable based upon symptoms and exam. Continue current treatment plan and follow up at least yearly.   Last edited 04/21/19 00:30 EDT by Lizy Ackerman MD           Electronically signed by Lizy Ackerman MD on 4/21/2019 at 12:30 AM

## 2019-04-21 ASSESSMENT — ENCOUNTER SYMPTOMS
CHEST TIGHTNESS: 1
SHORTNESS OF BREATH: 1

## 2019-04-26 ENCOUNTER — TELEPHONE (OUTPATIENT)
Dept: FAMILY MEDICINE CLINIC | Age: 69
End: 2019-04-26

## 2019-04-26 DIAGNOSIS — I20.8 ANGINAL EQUIVALENT (HCC): ICD-10-CM

## 2019-04-26 DIAGNOSIS — R01.1 HEART MURMUR: ICD-10-CM

## 2019-04-26 DIAGNOSIS — R53.83 OTHER FATIGUE: ICD-10-CM

## 2019-04-26 DIAGNOSIS — R06.02 SHORTNESS OF BREATH: Primary | ICD-10-CM

## 2019-04-26 DIAGNOSIS — I51.89 DIASTOLIC DYSFUNCTION: ICD-10-CM

## 2019-04-26 NOTE — TELEPHONE ENCOUNTER
[facility] contacted office and states they need new order for stress test. Due to age cardiologist at lab states she needs  stress test with myoview instead of without. Order pended. Please fax new order to [phone].

## 2019-05-02 DIAGNOSIS — R06.02 SHORTNESS OF BREATH: ICD-10-CM

## 2019-05-02 DIAGNOSIS — R53.83 OTHER FATIGUE: ICD-10-CM

## 2019-05-02 DIAGNOSIS — R01.1 HEART MURMUR: ICD-10-CM

## 2019-05-02 DIAGNOSIS — I51.89 DIASTOLIC DYSFUNCTION: ICD-10-CM

## 2019-05-02 DIAGNOSIS — I20.8 ANGINAL EQUIVALENT (HCC): ICD-10-CM

## 2019-05-03 DIAGNOSIS — I20.8 ANGINAL EQUIVALENT (HCC): ICD-10-CM

## 2019-05-03 DIAGNOSIS — R06.02 SOB (SHORTNESS OF BREATH): ICD-10-CM

## 2019-05-03 DIAGNOSIS — R94.39 ABNORMAL STRESS TEST: Primary | ICD-10-CM

## 2019-05-07 ENCOUNTER — PATIENT MESSAGE (OUTPATIENT)
Dept: FAMILY MEDICINE CLINIC | Age: 69
End: 2019-05-07

## 2019-05-08 NOTE — TELEPHONE ENCOUNTER
From: Javi Bond  To: Jeni Chaudhari MD  Sent: 5/7/2019 12:58 PM EDT  Subject: Visit Follow-Up Question    Dr Cyn Chacon as you know, I have an appointment with Dr. Akbar Faye next week. My follow up appt with you is on the same day but I think it is to see how I am doing on the medication you prescribed for me for depression. I picked up the medication but I have not taken it yet. I wanted to wait until this heart issue is explored and testing is done. Would it be better to cancel my 5/15 appt with you and just schedule my annual visit with you after June 17?

## 2019-06-19 ENCOUNTER — OFFICE VISIT (OUTPATIENT)
Dept: FAMILY MEDICINE CLINIC | Age: 69
End: 2019-06-19
Payer: MEDICARE

## 2019-06-19 VITALS
BODY MASS INDEX: 27.94 KG/M2 | RESPIRATION RATE: 18 BRPM | TEMPERATURE: 97.3 F | DIASTOLIC BLOOD PRESSURE: 74 MMHG | WEIGHT: 155.25 LBS | SYSTOLIC BLOOD PRESSURE: 120 MMHG

## 2019-06-19 DIAGNOSIS — E03.9 HYPOTHYROIDISM, UNSPECIFIED TYPE: ICD-10-CM

## 2019-06-19 DIAGNOSIS — E78.5 HYPERLIPIDEMIA, UNSPECIFIED HYPERLIPIDEMIA TYPE: ICD-10-CM

## 2019-06-19 DIAGNOSIS — R73.09 ELEVATED GLUCOSE: ICD-10-CM

## 2019-06-19 DIAGNOSIS — R53.83 OTHER FATIGUE: ICD-10-CM

## 2019-06-19 DIAGNOSIS — I34.0 MILD MITRAL REGURGITATION BY PRIOR ECHOCARDIOGRAM: ICD-10-CM

## 2019-06-19 DIAGNOSIS — I51.89 DIASTOLIC DYSFUNCTION: ICD-10-CM

## 2019-06-19 DIAGNOSIS — M81.0 AGE-RELATED OSTEOPOROSIS WITHOUT CURRENT PATHOLOGICAL FRACTURE: ICD-10-CM

## 2019-06-19 DIAGNOSIS — E55.9 VITAMIN D DEFICIENCY: ICD-10-CM

## 2019-06-19 DIAGNOSIS — R31.29 MICROHEMATURIA: ICD-10-CM

## 2019-06-19 DIAGNOSIS — R45.4 IRRITABLE MOOD: Primary | ICD-10-CM

## 2019-06-19 PROCEDURE — G8417 CALC BMI ABV UP PARAM F/U: HCPCS | Performed by: PEDIATRICS

## 2019-06-19 PROCEDURE — 3014F SCREEN MAMMO DOC REV: CPT | Performed by: PEDIATRICS

## 2019-06-19 PROCEDURE — G8427 DOCREV CUR MEDS BY ELIG CLIN: HCPCS | Performed by: PEDIATRICS

## 2019-06-19 PROCEDURE — 3017F COLORECTAL CA SCREEN DOC REV: CPT | Performed by: PEDIATRICS

## 2019-06-19 PROCEDURE — G8400 PT W/DXA NO RESULTS DOC: HCPCS | Performed by: PEDIATRICS

## 2019-06-19 PROCEDURE — 99214 OFFICE O/P EST MOD 30 MIN: CPT | Performed by: PEDIATRICS

## 2019-06-19 PROCEDURE — 4040F PNEUMOC VAC/ADMIN/RCVD: CPT | Performed by: PEDIATRICS

## 2019-06-19 PROCEDURE — 1090F PRES/ABSN URINE INCON ASSESS: CPT | Performed by: PEDIATRICS

## 2019-06-19 PROCEDURE — 1036F TOBACCO NON-USER: CPT | Performed by: PEDIATRICS

## 2019-06-19 PROCEDURE — 1123F ACP DISCUSS/DSCN MKR DOCD: CPT | Performed by: PEDIATRICS

## 2019-06-19 PROCEDURE — G8598 ASA/ANTIPLAT THER USED: HCPCS | Performed by: PEDIATRICS

## 2019-06-19 ASSESSMENT — ENCOUNTER SYMPTOMS
TROUBLE SWALLOWING: 0
CONSTIPATION: 0
EYE DISCHARGE: 0
BACK PAIN: 0
SINUS PRESSURE: 0
DIARRHEA: 0
EYE REDNESS: 0
SORE THROAT: 0
COLOR CHANGE: 0
RHINORRHEA: 0
ABDOMINAL DISTENTION: 1
WHEEZING: 0
BLOOD IN STOOL: 0
ABDOMINAL PAIN: 0
SHORTNESS OF BREATH: 0
VOMITING: 0
COUGH: 0
NAUSEA: 0
EYE PAIN: 0

## 2019-06-19 NOTE — PROGRESS NOTES
Subjective:      Patient ID: Marlene Yost is a 70 y.o. female. Visit Information    Have you changed or started any medications since your last visit including any over-the-counter medicines, vitamins, or herbal medicines? no   Are you having any side effects from any of your medications? -  no  Have you stopped taking any of your medications? Is so, why? -  yes - has not started the Zoloft    Have you seen any other physician or provider since your last visit? No  Have you had any other diagnostic tests since your last visit? No  Have you been seen in the emergency room and/or had an admission to a hospital since we last saw you? No  Have you had your routine dental cleaning in the past 6 months? no    Have you activated your [employer] account? If not, what are your barriers?  Yes     Patient Care Team:  Lalit Murphy MD as PCP - General (Pediatrics)  Lalit Murphy MD as PCP - [facility] Provider    Medical History Review  Past Medical, Family, and Social History reviewed and does contribute to the patient presenting condition    Health Maintenance   Topic Date Due    Annual Wellness Visit (AWV)  11/14/2015    Breast cancer screen  03/27/2019    Shingles Vaccine (3 of 3) 04/20/2019    A1C test (Diabetic or Prediabetic)  06/12/2019    TSH testing  06/12/2019    Colon Cancer Screen FIT/FOBT  06/13/2019    Lipid screen  06/12/2023    Flu vaccine  Completed    Pneumococcal 65+ years Vaccine  Completed    Hepatitis C screen  Completed    DEXA (modify frequency per FRAX score)  Addressed       PHQ Scores 4/17/2019 4/25/2018 2/1/2016 1/23/2015   PHQ2 Score 3 0 0 0   PHQ9 Score 5 0 0 0     Interpretation of Total Score DepressionSeverity: 1-4 = Minimal depression, 5-9 = Mild depression, 10-14 = Moderate depression, 15-19 = Moderately severe depression, 20-27 = Severe depression    Current Outpatient Medications   Medication Sig Dispense Refill    levothyroxine (SYNTHROID) 112 MCG tablet Take 1 tablet by mouth daily 90 tablet 1    albuterol sulfate HFA (VENTOLIN HFA) 108 (90 Base) MCG/ACT inhaler Inhale 2 puffs into the lungs every 4 hours as needed for Wheezing 1 Inhaler 1    simvastatin (ZOCOR) 40 MG tablet Take 1 tablet by mouth nightly 90 tablet 1    sertraline (ZOLOFT) 25 MG tablet Take 1 tablet by mouth daily 30 tablet 2     No current facility-administered medications for this visit. HPI:      Patient presents today for routine follow-up of irritable mood that was noticed several months ago after she stopped smoking. She was prescribed Zoloft but she tells me that she has not actually started this. She does feel as though she is doing well and she does not wish to start this at this time. He states her irritability does seem to be improved. She has not started smoking again and I congratulated her on this. If her symptoms recur she will consider starting this medication. She does have a history of hypothyroidism, hyperlipidemia, osteoporosis and vitamin D deficiency. She also has a history of microhematuria that has been worked up in the past by urology. She does continue to take her thyroid medication and cholesterol lowering medication without difficulty. She does take calcium and vitamin D supplementation. She does have a history of a heart murmur and was diagnosed with diastolic dysfunction and mild MR after an echocardiogram was performed. She did complain of fatigue at her last office visit so a stress test was performed. This did show some abnormalities on the EKG portion of the stress test but the nuclear portion was normal.  Because of her symptoms and suspected anginal equivalent refer her to cardiology. Cardiology evaluated her and felt as though her symptoms were related to her weight gain safety concern on stress testing. She does have a history of breast cancer and did have a left mastectomy.  She does not wish to have a DEXA scan or colonoscopy at this time. cmw      Anxiety: Patient complains of evaluation of anxiety disorder. She has the following anxiety symptoms: irritable. Onset of symptoms was approximately several years ago, gradually improving since that time. She denies current suicidal and homicidal ideation. Family history significant for no psychiatric illness. Possible organic causes contributing are: none. Risk factors: none Previous treatment includes Zoloft and none. She complains of the following side effects from the treatment: none. Hypothyroidism: Patient presents for evaluation of thyroid function. Symptoms consist of denies fatigue, weight changes, heat/cold intolerance, bowel/skin changes or CVS symptoms. Symptoms have present for several years. The symptoms are no. The problem has been gradually improving. Previous thyroid studies include TSH. The hypothyroidism is due to hypothyroidism. Hyperlipidemia   This is a chronic problem. The current episode started more than 1 year ago. Recent lipid tests were reviewed and are variable. Exacerbating diseases include hypothyroidism. She has no history of chronic renal disease, diabetes, liver disease, obesity or nephrotic syndrome. Pertinent negatives include no chest pain, focal sensory loss, focal weakness, leg pain, myalgias or shortness of breath. Current antihyperlipidemic treatment includes statins. The current treatment provides mild improvement of lipids. There are no compliance problems. Review of Systems   Constitutional: Positive for appetite change, fatigue and unexpected weight change (has a lost few pounds). Negative for chills and fever. HENT: Negative for congestion, ear discharge, ear pain, postnasal drip, rhinorrhea, sinus pressure, sore throat, tinnitus and trouble swallowing. Eyes: Negative for pain, discharge and redness. Respiratory: Negative for cough, chest tightness, shortness of breath and wheezing.     Cardiovascular: Negative for chest pain, palpitations and leg swelling. Gastrointestinal: Positive for abdominal distention (bloating since stopping smoking). Negative for abdominal pain, blood in stool, constipation, diarrhea, nausea and vomiting. Endocrine: Negative for polydipsia, polyphagia and polyuria. History of hypothyroidism controlled with medication   Genitourinary: Negative for decreased urine volume, difficulty urinating, dysuria, flank pain, frequency, hematuria, pelvic pain, urgency and vaginal discharge. History of left breast cancer   Musculoskeletal: Negative for arthralgias, back pain and myalgias. Skin: Negative for color change and rash. Allergic/Immunologic: Negative for immunocompromised state. Neurological: Negative for dizziness, focal weakness, syncope, weakness, light-headedness and headaches. Hematological: Negative for adenopathy. Psychiatric/Behavioral: Positive for agitation (very irritable since stopping smoking -improved without medication). Negative for behavioral problems, confusion, dysphoric mood and sleep disturbance. The patient is not nervous/anxious. Objective:     /74 (Site: Right Upper Arm, Position: Sitting, Cuff Size: Medium Adult)   Temp 97.3 °F (36.3 °C) (Tympanic)   Resp 18   Wt 155 lb 4 oz (70.4 kg)   Breastfeeding? No   BMI 27.94 kg/m²        Physical Exam   Constitutional: She is oriented to person, place, and time. She appears well-developed and well-nourished. No distress. HENT:   Head: Normocephalic. Right Ear: External ear normal. No middle ear effusion. Left Ear: External ear normal.  No middle ear effusion. Nose: Mucosal edema present. Mouth/Throat: Uvula is midline, oropharynx is clear and moist and mucous membranes are normal. No oropharyngeal exudate, posterior oropharyngeal edema or posterior oropharyngeal erythema. Eyes: Pupils are equal, round, and reactive to light. Conjunctivae and EOM are normal. Right eye exhibits no discharge.  Left Lipid Panel    Comprehensive Metabolic Panel    CBC   4. Age-related osteoporosis without current pathological fracture     5. Vitamin D deficiency  Comprehensive Metabolic Panel    CBC    Vitamin D 25 Hydroxy   6. Microhematuria  Urinalysis Reflex to Culture   7. Other fatigue  Comprehensive Metabolic Panel    TSH without Reflex    CBC    Vitamin D 25 Hydroxy   8. Elevated glucose  Comprehensive Metabolic Panel    Hemoglobin A1C    CBC   9. Diastolic dysfunction     10. Mild mitral regurgitation by prior echocardiogram           Plan:     Do not start zoloft / monitor for symptoms of worsening  Consider counseling   Recommend continue current medications  Daily exercise / healthy diet   Weight reduction   Recommend obtain dexascan - patient declines  Recommend calcium and vitamin D supplementation  Obtain stress test   Consider pulmonary function testing  Shingles vaccine recommended  Advise call with concerns        Dez received counseling on the following healthy behaviors: nutrition, exercise and medication adherence  Reviewed prior labs and health maintenance  Continue current medications, diet and exercise. Discussed use, benefit, and side effects of prescribed medications. Barriers to medication compliance addressed. Patient given educational materials - see patient instructions  Was a self-tracking handout given in paper form or via [employer]? No    Requested Prescriptions      No prescriptions requested or ordered in this encounter       All patient questions answered. Patient voiced understanding. Quality Measures    Body mass index is 27.94 kg/m². Elevated. Weight control planned discussed Healthy diet and regular exercise. BP: 120/74. Blood pressure is normal. Treatment plan consists of No treatment change needed.     Fall Risk 6/19/2019 4/25/2018 2/1/2016 1/23/2015   2 or more falls in past year? no no no no   Fall with injury in past year? no no no no     The patient does not have a history of falls. I did , complete a risk assessment for falls.  A plan of care for falls No Treatment plan indicated    Lab Results   Component Value Date    LDLCHOLESTEROL 99 06/12/2018    (goal LDL reduction with dx if diabetes is 50% LDL reduction)    PHQ Scores 4/17/2019 4/25/2018 2/1/2016 1/23/2015   PHQ2 Score 3 0 0 0   PHQ9 Score 5 0 0 0     Interpretation of Total Score Depression Severity: 1-4 = Minimal depression, 5-9 = Mild depression, 10-14 = Moderate depression, 15-19 = Moderately severe depression, 20-27 = Severe depression        Electronically signed by Geovanny Polanco MD on 6/23/2019 at 12:05 AM

## 2019-06-23 ASSESSMENT — ENCOUNTER SYMPTOMS: CHEST TIGHTNESS: 0

## 2019-06-24 ENCOUNTER — HOSPITAL ENCOUNTER (OUTPATIENT)
Age: 69
Setting detail: SPECIMEN
Discharge: HOME | End: 2019-06-24
Payer: MEDICARE

## 2019-06-24 DIAGNOSIS — R31.29 MICROHEMATURIA: ICD-10-CM

## 2019-06-24 DIAGNOSIS — E78.5 HYPERLIPIDEMIA, UNSPECIFIED HYPERLIPIDEMIA TYPE: ICD-10-CM

## 2019-06-24 DIAGNOSIS — R53.83 OTHER FATIGUE: ICD-10-CM

## 2019-06-24 DIAGNOSIS — E03.9 HYPOTHYROIDISM, UNSPECIFIED TYPE: ICD-10-CM

## 2019-06-24 DIAGNOSIS — E55.9 VITAMIN D DEFICIENCY: ICD-10-CM

## 2019-06-24 DIAGNOSIS — R73.09 ELEVATED GLUCOSE: ICD-10-CM

## 2019-06-24 LAB
-: NORMAL
ALBUMIN SERPL-MCNC: 4.2 G/DL (ref 3.5–5.2)
ALBUMIN/GLOBULIN RATIO: 1.4 (ref 1–2.5)
ALP BLD-CCNC: 121 U/L (ref 35–104)
ALT SERPL-CCNC: 24 U/L (ref 5–33)
AMORPHOUS: NORMAL
ANION GAP SERPL CALCULATED.3IONS-SCNC: 14 MMOL/L (ref 9–17)
AST SERPL-CCNC: 24 U/L
BACTERIA: NORMAL
BILIRUB SERPL-MCNC: 0.55 MG/DL (ref 0.3–1.2)
BILIRUBIN URINE: NEGATIVE
BUN BLDV-MCNC: 10 MG/DL (ref 8–23)
BUN/CREAT BLD: ABNORMAL (ref 9–20)
CALCIUM SERPL-MCNC: 9.5 MG/DL (ref 8.6–10.4)
CASTS UA: NORMAL /LPF (ref 0–8)
CHLORIDE BLD-SCNC: 102 MMOL/L (ref 98–107)
CHOLESTEROL/HDL RATIO: 3.7
CHOLESTEROL: 141 MG/DL
CO2: 25 MMOL/L (ref 20–31)
COLOR: YELLOW
COMMENT UA: ABNORMAL
CREAT SERPL-MCNC: 0.95 MG/DL (ref 0.5–0.9)
CRYSTALS, UA: NORMAL /HPF
EPITHELIAL CELLS UA: NORMAL /HPF (ref 0–5)
ESTIMATED AVERAGE GLUCOSE: 134 MG/DL
GFR AFRICAN AMERICAN: >60 ML/MIN
GFR NON-AFRICAN AMERICAN: 58 ML/MIN
GFR SERPL CREATININE-BSD FRML MDRD: ABNORMAL ML/MIN/{1.73_M2}
GFR SERPL CREATININE-BSD FRML MDRD: ABNORMAL ML/MIN/{1.73_M2}
GLUCOSE BLD-MCNC: 104 MG/DL (ref 70–99)
GLUCOSE URINE: NEGATIVE
HBA1C MFR BLD: 6.3 % (ref 4–6)
HCT VFR BLD CALC: 40.8 % (ref 36.3–47.1)
HDLC SERPL-MCNC: 38 MG/DL
HEMOGLOBIN: 13.1 G/DL (ref 11.9–15.1)
KETONES, URINE: NEGATIVE
LDL CHOLESTEROL: 77 MG/DL (ref 0–130)
LEUKOCYTE ESTERASE, URINE: NEGATIVE
MCH RBC QN AUTO: 31 PG (ref 25.2–33.5)
MCHC RBC AUTO-ENTMCNC: 32.1 G/DL (ref 28.4–34.8)
MCV RBC AUTO: 96.7 FL (ref 82.6–102.9)
MUCUS: NORMAL
NITRITE, URINE: NEGATIVE
NRBC AUTOMATED: 0 PER 100 WBC
OTHER OBSERVATIONS UA: NORMAL
PDW BLD-RTO: 13.2 % (ref 11.8–14.4)
PH UA: 5 (ref 5–8)
PLATELET # BLD: 212 K/UL (ref 138–453)
PMV BLD AUTO: 11.6 FL (ref 8.1–13.5)
POTASSIUM SERPL-SCNC: 4.3 MMOL/L (ref 3.7–5.3)
PROTEIN UA: NEGATIVE
RBC # BLD: 4.22 M/UL (ref 3.95–5.11)
RBC UA: NORMAL /HPF (ref 0–4)
RENAL EPITHELIAL, UA: NORMAL /HPF
SODIUM BLD-SCNC: 141 MMOL/L (ref 135–144)
SPECIFIC GRAVITY UA: 1.01 (ref 1–1.03)
TOTAL PROTEIN: 7.1 G/DL (ref 6.4–8.3)
TRICHOMONAS: NORMAL
TRIGL SERPL-MCNC: 132 MG/DL
TSH SERPL DL<=0.05 MIU/L-ACNC: 0.19 MIU/L (ref 0.3–5)
TURBIDITY: CLEAR
URINE HGB: ABNORMAL
UROBILINOGEN, URINE: NORMAL
VITAMIN D 25-HYDROXY: 23.5 NG/ML (ref 30–100)
VLDLC SERPL CALC-MCNC: ABNORMAL MG/DL (ref 1–30)
WBC # BLD: 6.8 K/UL (ref 3.5–11.3)
WBC UA: NORMAL /HPF (ref 0–5)
YEAST: NORMAL

## 2019-06-27 DIAGNOSIS — E55.9 VITAMIN D DEFICIENCY: Primary | ICD-10-CM

## 2019-06-27 DIAGNOSIS — E78.5 HYPERLIPIDEMIA, UNSPECIFIED HYPERLIPIDEMIA TYPE: Chronic | ICD-10-CM

## 2019-06-27 DIAGNOSIS — R73.09 ELEVATED GLUCOSE: ICD-10-CM

## 2019-06-27 DIAGNOSIS — R79.89 DECREASED THYROID STIMULATING HORMONE (TSH) LEVEL: ICD-10-CM

## 2019-06-27 DIAGNOSIS — R79.89 ELEVATED TSH: ICD-10-CM

## 2019-06-27 DIAGNOSIS — E03.9 HYPOTHYROIDISM, UNSPECIFIED TYPE: Chronic | ICD-10-CM

## 2019-06-27 LAB — THYROXINE, FREE: 1.83 NG/DL (ref 0.93–1.7)

## 2019-06-27 RX ORDER — SIMVASTATIN 40 MG
40 TABLET ORAL NIGHTLY
Qty: 90 TABLET | Refills: 1 | Status: SHIPPED | OUTPATIENT
Start: 2019-06-27 | End: 2020-03-26

## 2019-06-27 RX ORDER — LEVOTHYROXINE SODIUM 112 UG/1
112 TABLET ORAL DAILY
Qty: 90 TABLET | Refills: 1 | Status: CANCELLED | OUTPATIENT
Start: 2019-06-27 | End: 2020-06-26

## 2019-06-28 DIAGNOSIS — E03.9 HYPOTHYROIDISM, UNSPECIFIED TYPE: Primary | ICD-10-CM

## 2019-06-28 RX ORDER — LEVOTHYROXINE SODIUM 0.1 MG/1
100 TABLET ORAL DAILY
Qty: 90 TABLET | Refills: 0 | Status: SHIPPED | OUTPATIENT
Start: 2019-06-28 | End: 2019-09-30 | Stop reason: SDUPTHER

## 2019-06-28 NOTE — TELEPHONE ENCOUNTER
Per test results. Patient requesting a 90 day supply with no refills until she gets her blood redone in 3 months.

## 2019-08-22 ENCOUNTER — TELEPHONE (OUTPATIENT)
Dept: FAMILY MEDICINE CLINIC | Age: 69
End: 2019-08-22

## 2019-08-27 PROBLEM — I51.89 DIASTOLIC DYSFUNCTION: Status: ACTIVE | Noted: 2019-08-27

## 2019-08-27 PROBLEM — Z87.891 FORMER SMOKER: Status: ACTIVE | Noted: 2019-08-27

## 2019-08-27 PROBLEM — I34.0 MILD MITRAL REGURGITATION BY PRIOR ECHOCARDIOGRAM: Status: ACTIVE | Noted: 2019-08-27

## 2019-09-26 ENCOUNTER — HOSPITAL ENCOUNTER (OUTPATIENT)
Age: 69
Setting detail: SPECIMEN
Discharge: HOME | End: 2019-09-26
Payer: MEDICARE

## 2019-09-26 DIAGNOSIS — E55.9 VITAMIN D DEFICIENCY: ICD-10-CM

## 2019-09-26 DIAGNOSIS — E03.9 HYPOTHYROIDISM, UNSPECIFIED TYPE: Chronic | ICD-10-CM

## 2019-09-26 DIAGNOSIS — R73.09 ELEVATED GLUCOSE: ICD-10-CM

## 2019-09-26 LAB
ALBUMIN SERPL-MCNC: 4.3 G/DL (ref 3.5–5.2)
ALBUMIN/GLOBULIN RATIO: 1.4 (ref 1–2.5)
ALP BLD-CCNC: 121 U/L (ref 35–104)
ALT SERPL-CCNC: 20 U/L (ref 5–33)
ANION GAP SERPL CALCULATED.3IONS-SCNC: 17 MMOL/L (ref 9–17)
AST SERPL-CCNC: 20 U/L
BILIRUB SERPL-MCNC: 0.54 MG/DL (ref 0.3–1.2)
BUN BLDV-MCNC: 18 MG/DL (ref 8–23)
BUN/CREAT BLD: ABNORMAL (ref 9–20)
CALCIUM SERPL-MCNC: 9.6 MG/DL (ref 8.6–10.4)
CHLORIDE BLD-SCNC: 104 MMOL/L (ref 98–107)
CO2: 24 MMOL/L (ref 20–31)
CREAT SERPL-MCNC: 1.03 MG/DL (ref 0.5–0.9)
GFR AFRICAN AMERICAN: >60 ML/MIN
GFR NON-AFRICAN AMERICAN: 53 ML/MIN
GFR SERPL CREATININE-BSD FRML MDRD: ABNORMAL ML/MIN/{1.73_M2}
GFR SERPL CREATININE-BSD FRML MDRD: ABNORMAL ML/MIN/{1.73_M2}
GLUCOSE BLD-MCNC: 117 MG/DL (ref 70–99)
POTASSIUM SERPL-SCNC: 4.5 MMOL/L (ref 3.7–5.3)
SODIUM BLD-SCNC: 145 MMOL/L (ref 135–144)
THYROXINE, FREE: 1.3 NG/DL (ref 0.93–1.7)
TOTAL PROTEIN: 7.3 G/DL (ref 6.4–8.3)
TSH SERPL DL<=0.05 MIU/L-ACNC: 0.38 MIU/L (ref 0.3–5)
VITAMIN D 25-HYDROXY: 45.3 NG/ML (ref 30–100)

## 2019-09-27 LAB
ESTIMATED AVERAGE GLUCOSE: 126 MG/DL
HBA1C MFR BLD: 6 % (ref 4–6)

## 2019-09-30 DIAGNOSIS — E03.9 HYPOTHYROIDISM, UNSPECIFIED TYPE: ICD-10-CM

## 2019-09-30 RX ORDER — LEVOTHYROXINE SODIUM 0.1 MG/1
100 TABLET ORAL DAILY
Qty: 90 TABLET | Refills: 1 | Status: SHIPPED | OUTPATIENT
Start: 2019-09-30 | End: 2020-03-20

## 2019-09-30 NOTE — TELEPHONE ENCOUNTER
LOV 6/19/19  LRF 6/28/19  RTO 3 months    Health Maintenance   Topic Date Due    Low dose CT lung screening  10/18/2004    Breast cancer screen  03/27/2019    Annual Wellness Visit (AWV)  05/29/2019    Colon Cancer Screen FIT/FOBT  06/13/2019    Flu vaccine (1) 09/01/2019    Lipid screen  06/24/2020    A1C test (Diabetic or Prediabetic)  09/26/2020    TSH testing  09/26/2020    Shingles Vaccine  Completed    Pneumococcal 65+ years Vaccine  Completed    Hepatitis C screen  Completed    DEXA (modify frequency per FRAX score)  Addressed             (applicable per patient's age: Cancer Screenings, Depression Screening, Fall Risk Screening, Immunizations)    Hemoglobin A1C (%)   Date Value   09/26/2019 6.0   06/24/2019 6.3 (H)   06/12/2018 5.7     LDL Cholesterol (mg/dL)   Date Value   06/24/2019 77     AST (U/L)   Date Value   09/26/2019 20     ALT (U/L)   Date Value   09/26/2019 20     BUN (mg/dL)   Date Value   09/26/2019 18      (goal A1C is < 7)   (goal LDL is <100) need 30-50% reduction from baseline     BP Readings from Last 3 Encounters:   08/27/19 120/85   06/19/19 120/74   05/15/19 (!) 140/80    (goal /80)      All Future Testing planned in [facility]:  Lab Frequency Next Occurrence   (Gxt) Stress Test Exercise W Out Myoview Once 04/18/2019       Next Visit Date:  Future Appointments   Date Time Provider Nicole Kelly   10/16/2019  8:20 AM MD Milla Arredondo            Patient Active Problem List:     History of breast cancer in female     History of left mastectomy     Hypothyroid     Hyperlipidemia     Microhematuria     Vitamin D deficiency     Simple chronic bronchitis (HCC)     Mild mitral regurgitation by prior echocardiogram     Diastolic dysfunction     Former smoker  ,

## 2019-10-01 DIAGNOSIS — R79.89 DECREASED THYROID STIMULATING HORMONE (TSH) LEVEL: ICD-10-CM

## 2019-10-01 DIAGNOSIS — E55.9 VITAMIN D DEFICIENCY: ICD-10-CM

## 2019-10-01 DIAGNOSIS — E78.5 HYPERLIPIDEMIA, UNSPECIFIED HYPERLIPIDEMIA TYPE: ICD-10-CM

## 2019-10-01 DIAGNOSIS — E03.9 HYPOTHYROIDISM, UNSPECIFIED TYPE: Primary | ICD-10-CM

## 2019-10-01 DIAGNOSIS — R79.89 ELEVATED TSH: ICD-10-CM

## 2019-10-01 DIAGNOSIS — R73.09 ELEVATED GLUCOSE: ICD-10-CM

## 2019-10-30 ENCOUNTER — OFFICE VISIT (OUTPATIENT)
Dept: FAMILY MEDICINE CLINIC | Age: 69
End: 2019-10-30
Payer: MEDICARE

## 2019-10-30 VITALS
SYSTOLIC BLOOD PRESSURE: 122 MMHG | DIASTOLIC BLOOD PRESSURE: 78 MMHG | TEMPERATURE: 98.9 F | RESPIRATION RATE: 18 BRPM | WEIGHT: 156 LBS | HEART RATE: 70 BPM | BODY MASS INDEX: 27.63 KG/M2

## 2019-10-30 DIAGNOSIS — I51.89 DIASTOLIC DYSFUNCTION: ICD-10-CM

## 2019-10-30 DIAGNOSIS — M67.88 ACHILLES TENDONOSIS: ICD-10-CM

## 2019-10-30 DIAGNOSIS — Z23 NEED FOR INFLUENZA VACCINATION: ICD-10-CM

## 2019-10-30 DIAGNOSIS — R73.03 PREDIABETES: ICD-10-CM

## 2019-10-30 DIAGNOSIS — R53.83 OTHER FATIGUE: ICD-10-CM

## 2019-10-30 DIAGNOSIS — R31.29 MICROHEMATURIA: ICD-10-CM

## 2019-10-30 DIAGNOSIS — Z12.11 SCREENING FOR COLORECTAL CANCER: ICD-10-CM

## 2019-10-30 DIAGNOSIS — I34.0 MILD MITRAL REGURGITATION BY PRIOR ECHOCARDIOGRAM: ICD-10-CM

## 2019-10-30 DIAGNOSIS — R73.09 ELEVATED GLUCOSE: ICD-10-CM

## 2019-10-30 DIAGNOSIS — E78.5 HYPERLIPIDEMIA, UNSPECIFIED HYPERLIPIDEMIA TYPE: ICD-10-CM

## 2019-10-30 DIAGNOSIS — E03.9 HYPOTHYROIDISM, UNSPECIFIED TYPE: ICD-10-CM

## 2019-10-30 DIAGNOSIS — R45.4 IRRITABLE MOOD: Primary | ICD-10-CM

## 2019-10-30 DIAGNOSIS — Z12.12 SCREENING FOR COLORECTAL CANCER: ICD-10-CM

## 2019-10-30 DIAGNOSIS — E55.9 VITAMIN D DEFICIENCY: ICD-10-CM

## 2019-10-30 DIAGNOSIS — M81.0 AGE-RELATED OSTEOPOROSIS WITHOUT CURRENT PATHOLOGICAL FRACTURE: ICD-10-CM

## 2019-10-30 PROCEDURE — G9899 SCRN MAM PERF RSLTS DOC: HCPCS | Performed by: PEDIATRICS

## 2019-10-30 PROCEDURE — 3017F COLORECTAL CA SCREEN DOC REV: CPT | Performed by: PEDIATRICS

## 2019-10-30 PROCEDURE — G8598 ASA/ANTIPLAT THER USED: HCPCS | Performed by: PEDIATRICS

## 2019-10-30 PROCEDURE — 4040F PNEUMOC VAC/ADMIN/RCVD: CPT | Performed by: PEDIATRICS

## 2019-10-30 PROCEDURE — G8427 DOCREV CUR MEDS BY ELIG CLIN: HCPCS | Performed by: PEDIATRICS

## 2019-10-30 PROCEDURE — G8482 FLU IMMUNIZE ORDER/ADMIN: HCPCS | Performed by: PEDIATRICS

## 2019-10-30 PROCEDURE — 1123F ACP DISCUSS/DSCN MKR DOCD: CPT | Performed by: PEDIATRICS

## 2019-10-30 PROCEDURE — G8417 CALC BMI ABV UP PARAM F/U: HCPCS | Performed by: PEDIATRICS

## 2019-10-30 PROCEDURE — 99214 OFFICE O/P EST MOD 30 MIN: CPT | Performed by: PEDIATRICS

## 2019-10-30 PROCEDURE — 1090F PRES/ABSN URINE INCON ASSESS: CPT | Performed by: PEDIATRICS

## 2019-10-30 PROCEDURE — G8400 PT W/DXA NO RESULTS DOC: HCPCS | Performed by: PEDIATRICS

## 2019-10-30 PROCEDURE — G0008 ADMIN INFLUENZA VIRUS VAC: HCPCS | Performed by: PEDIATRICS

## 2019-10-30 PROCEDURE — 1036F TOBACCO NON-USER: CPT | Performed by: PEDIATRICS

## 2019-10-30 PROCEDURE — 90686 IIV4 VACC NO PRSV 0.5 ML IM: CPT | Performed by: PEDIATRICS

## 2019-10-30 ASSESSMENT — ENCOUNTER SYMPTOMS
SORE THROAT: 0
EYE DISCHARGE: 0
COLOR CHANGE: 0
SINUS PRESSURE: 0
DIARRHEA: 0
EYE REDNESS: 0
ABDOMINAL PAIN: 0
ABDOMINAL DISTENTION: 1
BACK PAIN: 0
EYE PAIN: 0
WHEEZING: 0
TROUBLE SWALLOWING: 0
VOMITING: 0
SHORTNESS OF BREATH: 0
NAUSEA: 0
BLOOD IN STOOL: 0
CONSTIPATION: 0
RHINORRHEA: 0
CHEST TIGHTNESS: 0
COUGH: 0

## 2019-11-06 DIAGNOSIS — Z12.12 SCREENING FOR COLORECTAL CANCER: ICD-10-CM

## 2019-11-06 DIAGNOSIS — Z12.11 SCREENING FOR COLORECTAL CANCER: ICD-10-CM

## 2019-11-06 LAB
CONTROL: NORMAL
HEMOCCULT STL QL: NORMAL

## 2019-11-06 PROCEDURE — 82274 ASSAY TEST FOR BLOOD FECAL: CPT | Performed by: PEDIATRICS

## 2020-03-19 NOTE — TELEPHONE ENCOUNTER
Last visit: 10/30/19  Last Med refill: n/a  Does patient have enough medication for 72 hours: No    Next Visit Date:  Future Appointments   Date Time Provider Nicole Kelly   4/27/2020  8:20 AM Jeremy Shelton MD [facility] [address] Maintenance   Topic Date Due    Low dose CT lung screening  10/18/2004    Breast cancer screen  03/27/2019    Annual Wellness Visit (AWV)  05/29/2019    Lipid screen  06/24/2020    A1C test (Diabetic or Prediabetic)  09/26/2020    TSH testing  09/26/2020    Colon Cancer Screen FIT/FOBT  11/06/2020    Flu vaccine  Completed    Shingles Vaccine  Completed    Pneumococcal 65+ years Vaccine  Completed    Hepatitis C screen  Completed    DEXA (modify frequency per FRAX score)  Addressed    Hepatitis A vaccine  Aged Out    Hepatitis B vaccine  Aged Out    Hib vaccine  Aged Out    Meningococcal (ACWY) vaccine  Aged Out       Hemoglobin A1C (%)   Date Value   09/26/2019 6.0   06/24/2019 6.3 (H)   06/12/2018 5.7             ( goal A1C is < 7)   No results found for: LABMICR  LDL Cholesterol (mg/dL)   Date Value   06/24/2019 77   06/12/2018 99       (goal LDL is <100)   AST (U/L)   Date Value   09/26/2019 20     ALT (U/L)   Date Value   09/26/2019 20     BUN (mg/dL)   Date Value   09/26/2019 18     BP Readings from Last 3 Encounters:   10/30/19 122/78   08/27/19 120/85   06/19/19 120/74          (goal 120/80)    All Future Testing planned in [facility]  Lab Frequency Next Occurrence   (Gxt) Stress Test Exercise W Out Myoview Once 04/18/2019   Hemoglobin A1C Once 01/01/2020   Comprehensive Metabolic Panel Once 10/49/4673   Vitamin D 25 Hydroxy Once 01/01/2020   TSH Once 01/01/2020   T4, Free Once 01/01/2020               Patient Active Problem List:     History of breast cancer in female     History of left mastectomy     Hypothyroid     Hyperlipidemia     Microhematuria     Vitamin D deficiency     Simple chronic bronchitis (HCC)     Mild mitral regurgitation by prior echocardiogram     Diastolic dysfunction     Former smoker     Prediabetes     Age-related osteoporosis without current pathological fracture

## 2020-03-19 NOTE — TELEPHONE ENCOUNTER
Please review previous thyroid medications. The prescription sent to me is for Euthyrox. Normally this is never covered under her insurance and cost well over $200. Why was this prescription changed from her previous prescriptions of levothyroxine (Synthroid)? Does this need to be changed?

## 2020-03-20 RX ORDER — LEVOTHYROXINE SODIUM 100 UG/1
TABLET ORAL
Qty: 90 TABLET | Refills: 1 | Status: SHIPPED | OUTPATIENT
Start: 2020-03-20 | End: 2020-09-25

## 2020-04-27 ENCOUNTER — E-VISIT (OUTPATIENT)
Dept: FAMILY MEDICINE CLINIC | Age: 70
End: 2020-04-27
Payer: MEDICARE

## 2020-04-27 PROCEDURE — 99421 OL DIG E/M SVC 5-10 MIN: CPT | Performed by: PEDIATRICS

## 2020-04-27 RX ORDER — SULFAMETHOXAZOLE AND TRIMETHOPRIM 800; 160 MG/1; MG/1
1 TABLET ORAL 2 TIMES DAILY
Qty: 14 TABLET | Refills: 0 | Status: SHIPPED | OUTPATIENT
Start: 2020-04-27 | End: 2020-05-04

## 2020-05-04 ENCOUNTER — TELEPHONE (OUTPATIENT)
Dept: FAMILY MEDICINE CLINIC | Age: 70
End: 2020-05-04

## 2020-05-04 NOTE — TELEPHONE ENCOUNTER
Patient is aware of instructions and recommendations. Patient expressed understanding.  Bactrim added to Allergy list

## 2020-05-10 ENCOUNTER — NURSE TRIAGE (OUTPATIENT)
Dept: OTHER | Age: 70
End: 2020-05-10

## 2020-05-10 NOTE — TELEPHONE ENCOUNTER
Patient calling concerned about hives. Education provided per guidelines. Patient wishes to speak to on call provider to possibly prevent exposure of going to urgent care. patient states she is miserable. On call provider El nicholas. Reason for Disposition   [1] MODERATE-SEVERE hives persist (i.e., hives interfere with normal activities or work) AND [2] taking antihistamine (e.g., Benadryl, Claritin) > 24 hours    Answer Assessment - Initial Assessment Questions  1. APPEARANCE: \"What does the rash look like? \"       Raised, red, itching. 2. LOCATION: \"Where is the rash located? \"    arms, legs, abdomen, back. patient states hives are widespread. 3. NUMBER: \"How many hives are there? \"       Patient states they are everywhere. 4. SIZE: \"How big are the hives? \" (inches, cm, compare to coins) \"Do they all look the same or is there lots of variation in shape and size? \"    range in sizes per patient. Some dime sized. 5. ONSET: \"When did the hives begin? \" (Hours or days ago)   Pateint states she broke out in small amount of hives after being treated for UTI, while on Bactrim. Patient states that Dr. Sandra Tong had her take benadryl and they went away. Last dose of bactrim was last Monday about 1 week ago per patient. The current batch of hives started last night per patient. Patient has been taking benadryl again, and it is not helping this time per patient. 6. ITCHING: \"Does it itch? \" If so, ask: \"How bad is the itch? \"     - MILD: doesn't interfere with normal activities    - MODERATE - SEVERE: interferes with work, school, sleep, or other activities   Moderate-severe. 7. RECURRENT PROBLEM: \"Have you had hives before? \" If so, ask: \"When was the last time? \" and \"What happened that time? \"       *No Answer*  8. TRIGGERS: \"Were you exposed to any new food, plant, cosmetic product or animal just before the hives began? \"      *No Answer*  9. OTHER SYMPTOMS: \"Do you have any other symptoms? \" (e.g., fever, tongue swelling, difficulty breathing, abdominal pain)  No fever, no swelling, no trouble swallowing or breathing. 10. PREGNANCY: \"Is there any chance you are pregnant? \" \"When was your last menstrual period? \"        *No Answer*    Protocols used: [facility]

## 2020-05-11 ENCOUNTER — TELEMEDICINE (OUTPATIENT)
Dept: FAMILY MEDICINE CLINIC | Age: 70
End: 2020-05-11
Payer: MEDICARE

## 2020-05-11 VITALS — SYSTOLIC BLOOD PRESSURE: 130 MMHG | DIASTOLIC BLOOD PRESSURE: 68 MMHG | TEMPERATURE: 98.5 F

## 2020-05-11 PROCEDURE — G8510 SCR DEP NEG, NO PLAN REQD: HCPCS | Performed by: NURSE PRACTITIONER

## 2020-05-11 PROCEDURE — 99213 OFFICE O/P EST LOW 20 MIN: CPT | Performed by: NURSE PRACTITIONER

## 2020-05-11 RX ORDER — PREDNISONE 10 MG/1
10 TABLET ORAL DAILY
Qty: 21 EACH | Refills: 0 | Status: SHIPPED | OUTPATIENT
Start: 2020-05-11 | End: 2020-08-24 | Stop reason: ALTCHOICE

## 2020-05-11 RX ORDER — HYDROXYZINE HYDROCHLORIDE 25 MG/1
TABLET, FILM COATED ORAL
COMMUNITY
Start: 2020-05-10 | End: 2020-08-24

## 2020-05-11 ASSESSMENT — ENCOUNTER SYMPTOMS
COUGH: 0
WHEEZING: 0
SHORTNESS OF BREATH: 0
GASTROINTESTINAL NEGATIVE: 1
CHEST TIGHTNESS: 0

## 2020-05-11 ASSESSMENT — PATIENT HEALTH QUESTIONNAIRE - PHQ9
1. LITTLE INTEREST OR PLEASURE IN DOING THINGS: 1
SUM OF ALL RESPONSES TO PHQ QUESTIONS 1-9: 2
2. FEELING DOWN, DEPRESSED OR HOPELESS: 1
SUM OF ALL RESPONSES TO PHQ9 QUESTIONS 1 & 2: 2
SUM OF ALL RESPONSES TO PHQ QUESTIONS 1-9: 2

## 2020-05-11 NOTE — PROGRESS NOTES
every 8 hours. Plan:  Return for Call if systems do not improve or get worse. An  electronic signature was used to authenticate this note. Communication:  Items for Patient/Writer/Staff to Accomplish  If symptoms do not improve with prednisone by mouth, patient will need to come to the office for proper evaluation. Stressed to patient that if she starts noticing any shortness of breath, chest discomfort, or wheezing, that she will need to go to the nearest emergency room. Patient agrees with plan of care and verbalized understanding. --Rigo Gamboa, APRN - CNP on 5/11/2020 at 10:56 AM19}    This is a telehealth visit that was performed with the originating site at Patient Location: home and Provider Location of [place], [place]. Verbal consent to participate in video visit was obtained. Pursuant to the emergency declaration under the [address] [place] [address], [facility] waiver authority and the [employer] Act, this Virtual Visit was conducted, with patient's consent, to reduce the patient's risk of exposure to COVID-19 and provide continuity of care for an established/new patient. Services were provided through a video synchronous discussion virtually to substitute for in-person clinic visit. I discussed with the patient the nature of our telehealth visits via interactive/real-time audio/video that:  - I would evaluate the patient and recommend diagnostics and treatments based on my assessment  - Our sessions are not being recorded and that personal health information is protected  - Our team would provide follow up care in person if/when the patient needs it. Services were provided through a video synchronous discussion virtually to substitute for in-person clinic visit.

## 2020-08-24 ENCOUNTER — OFFICE VISIT (OUTPATIENT)
Dept: FAMILY MEDICINE CLINIC | Age: 70
End: 2020-08-24
Payer: MEDICARE

## 2020-08-24 VITALS
SYSTOLIC BLOOD PRESSURE: 134 MMHG | WEIGHT: 148.4 LBS | TEMPERATURE: 96.3 F | RESPIRATION RATE: 16 BRPM | DIASTOLIC BLOOD PRESSURE: 78 MMHG | HEIGHT: 62 IN | HEART RATE: 85 BPM | BODY MASS INDEX: 27.31 KG/M2

## 2020-08-24 PROCEDURE — 3023F SPIROM DOC REV: CPT | Performed by: PEDIATRICS

## 2020-08-24 PROCEDURE — 3017F COLORECTAL CA SCREEN DOC REV: CPT | Performed by: PEDIATRICS

## 2020-08-24 PROCEDURE — 1123F ACP DISCUSS/DSCN MKR DOCD: CPT | Performed by: PEDIATRICS

## 2020-08-24 PROCEDURE — 4040F PNEUMOC VAC/ADMIN/RCVD: CPT | Performed by: PEDIATRICS

## 2020-08-24 PROCEDURE — 99214 OFFICE O/P EST MOD 30 MIN: CPT | Performed by: PEDIATRICS

## 2020-08-24 PROCEDURE — G8427 DOCREV CUR MEDS BY ELIG CLIN: HCPCS | Performed by: PEDIATRICS

## 2020-08-24 PROCEDURE — 1036F TOBACCO NON-USER: CPT | Performed by: PEDIATRICS

## 2020-08-24 PROCEDURE — G8400 PT W/DXA NO RESULTS DOC: HCPCS | Performed by: PEDIATRICS

## 2020-08-24 PROCEDURE — G8417 CALC BMI ABV UP PARAM F/U: HCPCS | Performed by: PEDIATRICS

## 2020-08-24 PROCEDURE — 1090F PRES/ABSN URINE INCON ASSESS: CPT | Performed by: PEDIATRICS

## 2020-08-24 PROCEDURE — G8926 SPIRO NO PERF OR DOC: HCPCS | Performed by: PEDIATRICS

## 2020-08-24 ASSESSMENT — ENCOUNTER SYMPTOMS
VOMITING: 0
COLOR CHANGE: 0
SHORTNESS OF BREATH: 0
ABDOMINAL PAIN: 0
WHEEZING: 0
EYE DISCHARGE: 0
EYE PAIN: 0
ABDOMINAL DISTENTION: 0
NAUSEA: 0
CHEST TIGHTNESS: 0
BACK PAIN: 0
SORE THROAT: 0
BLOOD IN STOOL: 0
SINUS PRESSURE: 0
EYE REDNESS: 0
CONSTIPATION: 0
DIARRHEA: 0
TROUBLE SWALLOWING: 0
RHINORRHEA: 0
COUGH: 1

## 2020-08-24 NOTE — PROGRESS NOTES
= Moderately severe depression, 20-27 = Severe depression    Current Outpatient Medications   Medication Sig Dispense Refill    VITAMIN D, CHOLECALCIFEROL, PO Take 4,000 Units by mouth daily      simvastatin (ZOCOR) 40 MG tablet Take 1 tablet by mouth nightly 90 tablet 1    EUTHYROX 100 MCG tablet Take 1 tablet by mouth once daily 90 tablet 1    albuterol sulfate HFA (VENTOLIN HFA) 108 (90 Base) MCG/ACT inhaler Inhale 2 puffs into the lungs every 4 hours as needed for Wheezing (Patient not taking: Reported on 8/24/2020) 1 Inhaler 1     No current facility-administered medications for this visit. HPI    Patient presents today for routine yearly follow-up/physical. She does have multiple medical issues which include hypothyroidism, hyperlipidemia, prediabetes, osteoporosis with vitamin D deficiency, irritability / irritable mood, history of tobacco use, chronic bronchitis and microhematuria. She does continue to take her thyroid medication as prescribed for her hypothyroidism. She is due for labs. She does continue to take Zocor for her history of hyperlipidemia and she is tolerating this without side effects. She does have a history of an impaired fasting blood sugar and prediabetes with her last hemoglobin A1c being 6.0. She is monitored regularly and will be due for labs soon. She has actually lost 8 pounds since her last visit in 2019. She does have a history of osteoporosis and vitamin D deficiency for which she is taking calcium and vitamin D supplementation. She does not wish to have a DEXA scan done. She did take Actonel for some time but does not wish to take any medications. I did encourage her to do weightbearing exercise regularly and to continue her vitamin D and calcium supplement. She was previously prescribed Zoloft for her history of irritable mood secondary to smoking cessation but she never took this medication. She states that she has been doing fairly well.   She does admit to having an occasional irritable mood but does not see this as a problem. She does have a history of chronic bronchitis secondary to her history of smoking. She did quit smoking several years ago. We did discuss having a CT lung screen however she is not sure if this will be covered under her insurance so she will look into this. She will consider this in the future. She does have a history of microhematuria that has been worked up in the past by urology. She does have a history of a heart murmur and did have a work-up with echocardiogram and stress test.  Her echocardiogram did show diastolic dysfunction and mild mitral regurgitation. She did see cardiology and they had nothing further to offer and cleared her from a cardiac standpoint. She does complain of a painful lump over the Achilles tendon on both feet. She is only able to wear a  tennis shoe because all other shoes irritate this. She does not wish to see a foot and ankle specialist but will consider this in the future. Her one biggest complaint is that she states that her right leg hurts and she can hardly walk on it at times. This has been going on for 8-9 months. She has had a right hip replacement by Dr. Jonna Falk in 2008. She complains actually that is more her right hip that hurts. She denies any right knee pain and denies any back pain or neck pain. She denies any numbness or tingling down the legs at all. She denies any new bowel or bladder incontinence. She has tried Aleve and Advil and nothing seems to help. She points to the lateral side of the hip when she is describing the pain. She does have a history of left breast cancer status post left mastectomy in 2000. She does have a right mammogram every 2 years. She does not wish to have a colonoscopy at this time but will be due for a fit test in the next several months. I did give this to her today to complete in December. She has no other concerns. cmw        Review of Systems   Constitutional: Positive for fatigue. Negative for appetite change, chills, fever and unexpected weight change. HENT: Negative for congestion, ear discharge, ear pain, postnasal drip, rhinorrhea, sinus pressure, sore throat, tinnitus and trouble swallowing. Eyes: Negative for pain, discharge and redness. Respiratory: Positive for cough (occasional ). Negative for chest tightness, shortness of breath and wheezing. Cardiovascular: Negative for chest pain, palpitations and leg swelling. Gastrointestinal: Negative for abdominal distention, abdominal pain, blood in stool, constipation, diarrhea, nausea and vomiting. Endocrine: Negative for polydipsia, polyphagia and polyuria. History of hypothyroidism controlled with medication   Genitourinary: Negative for decreased urine volume, difficulty urinating, dysuria, flank pain, frequency, hematuria, pelvic pain, urgency and vaginal discharge. History of left breast cancer   Musculoskeletal: Positive for arthralgias (right hip pain). Negative for back pain and myalgias. Painful lump over the left Achilles tendon and right Achilles tendon   Skin: Negative for color change and rash. Allergic/Immunologic: Negative for immunocompromised state. Neurological: Negative for dizziness, syncope, weakness, light-headedness and headaches. Hematological: Negative for adenopathy. Psychiatric/Behavioral: Positive for agitation (only occasional). Negative for behavioral problems, confusion, dysphoric mood and sleep disturbance. The patient is not nervous/anxious. Objective:     /78 (Site: Right Upper Arm, Position: Sitting, Cuff Size: Medium Adult)   Pulse 85   Temp 96.3 °F (35.7 °C) (Temporal)   Resp 16   Ht 5' 1.75\" (1.568 m)   Wt 148 lb 6.4 oz (67.3 kg)   BMI 27.36 kg/m²        Physical Exam  Vitals signs and nursing note reviewed. Constitutional:       General: She is not in acute distress. Appearance: Normal appearance. She is well-developed. She is not ill-appearing, toxic-appearing or diaphoretic. HENT:      Head: Normocephalic. Right Ear: Tympanic membrane, ear canal and external ear normal. No middle ear effusion. There is no impacted cerumen. Left Ear: Tympanic membrane, ear canal and external ear normal.  No middle ear effusion. There is no impacted cerumen. Nose: Mucosal edema present. No congestion or rhinorrhea. Mouth/Throat:      Mouth: Mucous membranes are moist.      Pharynx: Uvula midline. No oropharyngeal exudate or posterior oropharyngeal erythema. Eyes:      General: No scleral icterus. Right eye: No discharge. Left eye: No discharge. Extraocular Movements: Extraocular movements intact. Conjunctiva/sclera: Conjunctivae normal.      Pupils: Pupils are equal, round, and reactive to light. Neck:      Musculoskeletal: Normal range of motion and neck supple. Thyroid: No thyromegaly. Vascular: No JVD. Cardiovascular:      Rate and Rhythm: Normal rate and regular rhythm. Heart sounds: Murmur present. Systolic murmur present with a grade of 2/6. Pulmonary:      Effort: Pulmonary effort is normal. No respiratory distress. Breath sounds: No stridor. No decreased breath sounds, wheezing, rhonchi or rales. Abdominal:      General: Bowel sounds are normal. There is no distension. Palpations: Abdomen is soft. There is no mass. Tenderness: There is no abdominal tenderness. There is no right CVA tenderness, left CVA tenderness or guarding. Hernia: There is no hernia in the left inguinal area. Genitourinary:     Exam position: Supine. Labia:         Right: No rash, tenderness or lesion. Left: No rash, tenderness or lesion. Vagina: Normal.      Cervix: No cervical motion tenderness, discharge or friability. Adnexa:         Right: No mass, tenderness or fullness.           Left: No mass, tenderness or fullness. Musculoskeletal:      Right hip: She exhibits decreased range of motion (slight decrease range of motion with external rotation) and tenderness (directly over the trochanteric bursa). Right lower leg: No edema. Left lower leg: No edema. Feet:    Lymphadenopathy:      Cervical: No cervical adenopathy. Lower Body: No right inguinal adenopathy. No left inguinal adenopathy. Skin:     General: Skin is warm. Capillary Refill: Capillary refill takes less than 2 seconds. Coloration: Skin is not pale. Findings: No erythema or rash. Neurological:      General: No focal deficit present. Mental Status: She is alert and oriented to person, place, and time. Cranial Nerves: No cranial nerve deficit. Motor: No abnormal muscle tone. Coordination: Coordination normal.      Gait: Gait normal.      Deep Tendon Reflexes: Reflexes are normal and symmetric. Psychiatric:         Mood and Affect: Mood normal. Mood is not anxious or depressed. Speech: Speech normal.         Behavior: Behavior normal.         Thought Content: Thought content normal.         Judgment: Judgment normal.         Assessment:      Diagnosis Orders   1. Hypothyroidism, unspecified type  TSH without Reflex    T4, Free   2. Hyperlipidemia, unspecified hyperlipidemia type  Lipid Panel    Comprehensive Metabolic Panel   3. Prediabetes  Hemoglobin A1C   4. Age-related osteoporosis without current pathological fracture     5. Vitamin D deficiency  Vitamin D 25 Hydroxy   6. Irritable mood     7. History of tobacco use     8. Simple chronic bronchitis ([address]     9. Microhematuria     10. Diastolic dysfunction     11. Mild mitral regurgitation by prior echocardiogram     12. Achilles tendonosis     13. Right hip pain  XR HIP RIGHT (2-3 VIEWS)   14. History of right breast cancer  AJIT BURAK DIGITAL SCREEN UNILATERAL RIGHT   15.  Encounter for screening mammogram for malignant Fall Risk 8/24/2020 6/19/2019 4/25/2018 2/1/2016 1/23/2015   2 or more falls in past year? no no no no no   Fall with injury in past year? no no no no no     The patient does not have a history of falls. I did , complete a risk assessment for falls.  A plan of care for falls No Treatment plan indicated    Lab Results   Component Value Date    LDLCHOLESTEROL 77 06/24/2019    (goal LDL reduction with dx if diabetes is 50% LDL reduction)    PHQ Scores 5/11/2020 4/17/2019 4/25/2018 2/1/2016 1/23/2015   PHQ2 Score 2 3 0 0 0   PHQ9 Score 2 5 0 0 0     Interpretation of Total Score Depression Severity: 1-4 = Minimal depression, 5-9 = Mild depression, 10-14 = Moderate depression, 15-19 = Moderately severe depression, 20-27 = Severe depression        Electronically signed by Leah Martel MD on 8/24/2020 at 12:48 PM

## 2020-08-25 ENCOUNTER — HOSPITAL ENCOUNTER (OUTPATIENT)
Dept: GENERAL RADIOLOGY | Facility: CLINIC | Age: 70
Discharge: HOME | End: 2020-08-27
Payer: MEDICARE

## 2020-08-25 ENCOUNTER — HOSPITAL ENCOUNTER (OUTPATIENT)
Facility: CLINIC | Age: 70
Discharge: HOME | End: 2020-08-27
Payer: MEDICARE

## 2020-08-25 PROCEDURE — 73502 X-RAY EXAM HIP UNI 2-3 VIEWS: CPT

## 2020-09-15 ENCOUNTER — TELEPHONE (OUTPATIENT)
Dept: FAMILY MEDICINE CLINIC | Age: 70
End: 2020-09-15

## 2020-09-15 NOTE — TELEPHONE ENCOUNTER
I changed the order to say stereotactic. Not sure this is what they are wanting but hopefully. Please fax and close encounter when complete.

## 2020-09-21 ENCOUNTER — HOSPITAL ENCOUNTER (OUTPATIENT)
Age: 70
Setting detail: SPECIMEN
Discharge: HOME | End: 2020-09-21
Payer: MEDICARE

## 2020-09-21 LAB
ALBUMIN SERPL-MCNC: 4.2 G/DL (ref 3.5–5.2)
ALBUMIN/GLOBULIN RATIO: 1.6 (ref 1–2.5)
ALP BLD-CCNC: 97 U/L (ref 35–104)
ALT SERPL-CCNC: 18 U/L (ref 5–33)
ANION GAP SERPL CALCULATED.3IONS-SCNC: 15 MMOL/L (ref 9–17)
AST SERPL-CCNC: 19 U/L
BILIRUB SERPL-MCNC: 0.52 MG/DL (ref 0.3–1.2)
BUN BLDV-MCNC: 14 MG/DL (ref 8–23)
BUN/CREAT BLD: ABNORMAL (ref 9–20)
CALCIUM SERPL-MCNC: 9.4 MG/DL (ref 8.6–10.4)
CHLORIDE BLD-SCNC: 104 MMOL/L (ref 98–107)
CHOLESTEROL/HDL RATIO: 3.5
CHOLESTEROL: 154 MG/DL
CO2: 24 MMOL/L (ref 20–31)
CREAT SERPL-MCNC: 0.92 MG/DL (ref 0.5–0.9)
ESTIMATED AVERAGE GLUCOSE: 137 MG/DL
GFR AFRICAN AMERICAN: >60 ML/MIN
GFR NON-AFRICAN AMERICAN: >60 ML/MIN
GFR SERPL CREATININE-BSD FRML MDRD: ABNORMAL ML/MIN/{1.73_M2}
GFR SERPL CREATININE-BSD FRML MDRD: ABNORMAL ML/MIN/{1.73_M2}
GLUCOSE BLD-MCNC: 100 MG/DL (ref 70–99)
HBA1C MFR BLD: 6.4 % (ref 4–6)
HDLC SERPL-MCNC: 44 MG/DL
LDL CHOLESTEROL: 74 MG/DL (ref 0–130)
POTASSIUM SERPL-SCNC: 4.3 MMOL/L (ref 3.7–5.3)
SODIUM BLD-SCNC: 143 MMOL/L (ref 135–144)
THYROXINE, FREE: 1.35 NG/DL (ref 0.93–1.7)
TOTAL PROTEIN: 6.9 G/DL (ref 6.4–8.3)
TRIGL SERPL-MCNC: 181 MG/DL
TSH SERPL DL<=0.05 MIU/L-ACNC: 0.2 MIU/L (ref 0.3–5)
VITAMIN D 25-HYDROXY: 45.9 NG/ML (ref 30–100)
VLDLC SERPL CALC-MCNC: ABNORMAL MG/DL (ref 1–30)

## 2020-09-22 NOTE — PROGRESS NOTES
"\"I want to come in and do a swab tomorrow when I have my mammogram.\"   States that she has been having white watery vaginal discharge for 3 weeks.   \"I know what this is. I get the swab and then I get metronidazole. I get this all the time.\"   Denies fever, abdominal pain, back pain.   \"I just want to come in and do the swab.\"     Protocol and care advice reviewed.   Caller states understanding of the recommended disposition. Transferred caller to scheduling to make clinic appointment for today, no appointments available.  Caller wants a message sent to the clinic to see if she can get an order for a vaginal swab. States that she can come in today to do the swab \"I've done it myself in the bathroom.\" Or she would prefer to do the swab tomorrow when she has her mammogram.   Will send a message to the clinic per Patient request.     Advised to call back if further questions or concerns.     Reason for Disposition    All other vaginal symptoms  (Exception: feels like prior yeast infection, minor abrasion, mild rash < 24 hour duration, mild itching)    Additional Information    Negative: Patient sounds very sick or weak to the triager    Negative: [1] SEVERE pain AND [2] not improved 2 hours after pain medicine    Negative: [1] Genital area looks infected (e.g., draining sore, spreading redness) AND [2] fever    Negative: [1] Something is hanging out of the vagina AND [2] cannot easily be pushed back inside    Negative: MODERATE-SEVERE itching (i.e., interferes with school, work, or sleep)    Negative: Genital area looks infected (e.g., draining sore, spreading redness)    Negative: Rash with painful tiny water blisters    Negative: [1] Rash (e.g., redness, tiny bumps, sore) of genital area AND [2] present > 24 hours    Negative: Tender lump (swelling or \"ball\") at vaginal opening    Negative: [1] Symptoms of a yeast infection (i.e., itchy, white discharge, not bad smelling) AND    [2] not improved > 3 days following " Per test results CARE ADVICE    Negative: [1] Vaginal itching AND     [2] not improved > 3 days following CARE ADVICE    Negative: Patient is worried about sexually transmitted disease (STD)    Negative: Sounds like a life-threatening emergency to the triager    Protocols used: VAGINAL SYMPTOMS-ADULT-AH

## 2020-09-25 RX ORDER — SIMVASTATIN 40 MG
40 TABLET ORAL NIGHTLY
Qty: 90 TABLET | Refills: 1 | Status: SHIPPED | OUTPATIENT
Start: 2020-09-25 | End: 2021-04-07 | Stop reason: SDUPTHER

## 2020-09-25 RX ORDER — LEVOTHYROXINE SODIUM 100 UG/1
TABLET ORAL
Qty: 90 TABLET | Refills: 1 | Status: SHIPPED | OUTPATIENT
Start: 2020-09-25 | End: 2021-03-08

## 2020-09-25 NOTE — TELEPHONE ENCOUNTER
LV 10/30/2019  LRF Zocor 3/26/20 90 tablets 1 refill          Euthyrox 3/20/20 90 tablets 1 refill  RTO 2/25/21    Health Maintenance   Topic Date Due    Low dose CT lung screening  10/18/2004    Annual Wellness Visit (AWV)  05/29/2019    Flu vaccine (1) 09/01/2020    Colon Cancer Screen FIT/FOBT  11/06/2020    Statin Therapy  03/26/2021    Breast cancer screen  09/08/2021    A1C test (Diabetic or Prediabetic)  09/21/2021    Lipid screen  09/21/2021    TSH testing  09/21/2021    Shingles Vaccine  Completed    Pneumococcal 65+ years Vaccine  Completed    Hepatitis C screen  Completed    DEXA (modify frequency per FRAX score)  Addressed    Hepatitis A vaccine  Aged Out    Hepatitis B vaccine  Aged Out    Hib vaccine  Aged Out    Meningococcal (ACWY) vaccine  Aged Out             (applicable per patient's age: Cancer Screenings, Depression Screening, Fall Risk Screening, Immunizations)    Hemoglobin A1C (%)   Date Value   09/21/2020 6.4 (H)   09/26/2019 6.0   06/24/2019 6.3 (H)     LDL Cholesterol (mg/dL)   Date Value   09/21/2020 74     AST (U/L)   Date Value   09/21/2020 19     ALT (U/L)   Date Value   09/21/2020 18     BUN (mg/dL)   Date Value   09/21/2020 14      (goal A1C is < 7)   (goal LDL is <100) need 30-50% reduction from baseline     BP Readings from Last 3 Encounters:   08/24/20 134/78   05/11/20 130/68   10/30/19 122/78    (goal /80)      All Future Testing planned in [facility]:  Lab Frequency Next Occurrence   POCT Fecal Immunochemical Test (FIT) Once 09/14/2020   US BREAST BIOPSY W LOC DEVICE 1ST LESION RIGHT Once 10/11/2020   AJIT STEREO PLACE BREAST LOC DEVICE 1ST LESION RIGHT Once 09/15/2020   Comprehensive Metabolic Panel Once 36/78/3255   Hemoglobin A1C Once 12/22/2020   TSH without Reflex Once 12/22/2020   T4, Free Once 12/22/2020       Next Visit Date:  Future Appointments   Date Time Provider Nicole Kelly   2/25/2021  8:20 AM Lexie Martinez MD [facility]

## 2020-10-12 ENCOUNTER — TELEPHONE (OUTPATIENT)
Dept: FAMILY MEDICINE CLINIC | Age: 70
End: 2020-10-12

## 2020-10-16 ENCOUNTER — NURSE ONLY (OUTPATIENT)
Dept: FAMILY MEDICINE CLINIC | Age: 70
End: 2020-10-16
Payer: MEDICARE

## 2020-10-16 VITALS — TEMPERATURE: 97.2 F

## 2020-10-16 PROCEDURE — 90662 IIV NO PRSV INCREASED AG IM: CPT | Performed by: PEDIATRICS

## 2020-10-16 PROCEDURE — G0008 ADMIN INFLUENZA VIRUS VAC: HCPCS | Performed by: PEDIATRICS

## 2020-10-16 NOTE — PROGRESS NOTES
Zak Norman presents in the office today for Influenza vaccination. she denies any fevers or illness. she Tolerated the vaccination(s) well.  she was instructed to contact the office immediately if any significant sign of adverse reaction were to occur. There are no preventive care reminders to display for this patient. There are no preventive care reminders to display for this patient. Vaccine Information Sheet, \"Influenza - Inactivated\"  given to Shilo, or parent/legal guardian of  Shilo and verbalized understanding. Patient responses:    Have you ever had a reaction to a flu vaccine? No  Do you have any current illness? No  Have you ever had Guillian Ashton Syndrome? No  Do you have a serious allergy to any of the following: Neomycin, Polymyxin, Thimerosal, eggs or egg products? No    Flu vaccine given per order. Please see immunization tab. Risks and benefits explained. Current VIS given.

## 2020-11-27 ASSESSMENT — PATIENT HEALTH QUESTIONNAIRE - PHQ9
SUM OF ALL RESPONSES TO PHQ QUESTIONS 1-9: 0
2. FEELING DOWN, DEPRESSED OR HOPELESS: 0
1. LITTLE INTEREST OR PLEASURE IN DOING THINGS: 0
SUM OF ALL RESPONSES TO PHQ9 QUESTIONS 1 & 2: 0
SUM OF ALL RESPONSES TO PHQ QUESTIONS 1-9: 0
SUM OF ALL RESPONSES TO PHQ QUESTIONS 1-9: 0

## 2020-11-27 ASSESSMENT — LIFESTYLE VARIABLES
AUDIT-C TOTAL SCORE: INCOMPLETE
HOW OFTEN DO YOU HAVE A DRINK CONTAINING ALCOHOL: NEVER
HOW OFTEN DO YOU HAVE A DRINK CONTAINING ALCOHOL: 0
AUDIT TOTAL SCORE: INCOMPLETE

## 2020-12-04 ENCOUNTER — OFFICE VISIT (OUTPATIENT)
Dept: FAMILY MEDICINE CLINIC | Age: 70
End: 2020-12-04
Payer: MEDICARE

## 2020-12-04 VITALS
OXYGEN SATURATION: 97 % | SYSTOLIC BLOOD PRESSURE: 139 MMHG | BODY MASS INDEX: 28.89 KG/M2 | HEIGHT: 62 IN | HEART RATE: 74 BPM | DIASTOLIC BLOOD PRESSURE: 82 MMHG | WEIGHT: 157 LBS | RESPIRATION RATE: 14 BRPM | TEMPERATURE: 97.4 F

## 2020-12-04 PROCEDURE — G0439 PPPS, SUBSEQ VISIT: HCPCS | Performed by: PEDIATRICS

## 2020-12-04 PROCEDURE — 1123F ACP DISCUSS/DSCN MKR DOCD: CPT | Performed by: PEDIATRICS

## 2020-12-04 PROCEDURE — 4040F PNEUMOC VAC/ADMIN/RCVD: CPT | Performed by: PEDIATRICS

## 2020-12-04 PROCEDURE — G8484 FLU IMMUNIZE NO ADMIN: HCPCS | Performed by: PEDIATRICS

## 2020-12-04 PROCEDURE — G0296 VISIT TO DETERM LDCT ELIG: HCPCS | Performed by: PEDIATRICS

## 2020-12-04 PROCEDURE — 3017F COLORECTAL CA SCREEN DOC REV: CPT | Performed by: PEDIATRICS

## 2020-12-04 NOTE — PATIENT INSTRUCTIONS
Personalized Preventive Plan for Rosana Draft - 12/4/2020  Medicare offers a range of preventive health benefits. Some of the tests and screenings are paid in full while other may be subject to a deductible, co-insurance, and/or copay. Some of these benefits include a comprehensive review of your medical history including lifestyle, illnesses that may run in your family, and various assessments and screenings as appropriate. After reviewing your medical record and screening and assessments performed today your provider may have ordered immunizations, labs, imaging, and/or referrals for you. A list of these orders (if applicable) as well as your Preventive Care list are included within your After Visit Summary for your review. Other Preventive Recommendations:    · A preventive eye exam performed by an eye specialist is recommended every 1-2 years to screen for glaucoma; cataracts, macular degeneration, and other eye disorders. · A preventive dental visit is recommended every 6 months. · Try to get at least 150 minutes of exercise per week or 10,000 steps per day on a pedometer . · Order or download the FREE \"Exercise & Physical Activity: Your Everyday Guide\" from [employer]. Call 0-927.490.9704 or search [employer] online. · You need 0415-9166 mg of calcium and 1082-5779 IU of vitamin D per day. It is possible to meet your calcium requirement with diet alone, but a vitamin D supplement is usually necessary to meet this goal.  · When exposed to the sun, use a sunscreen that protects against both UVA and UVB radiation with an SPF of 30 or greater. Reapply every 2 to 3 hours or after sweating, drying off with a towel, or swimming. · Always wear a seat belt when traveling in a car. Always wear a helmet when riding a bicycle or motorcycle. What is lung cancer screening?   Lung cancer screening is a way in which doctors check the lungs for early signs of cancer

## 2020-12-04 NOTE — PROGRESS NOTES
Empaneled Provider    Wt Readings from Last 3 Encounters:   12/04/20 157 lb (71.2 kg)   10/19/20 148 lb 6.4 oz (67.3 kg)   08/24/20 148 lb 6.4 oz (67.3 kg)     Vitals:    12/04/20 0816   BP: 139/82   Site: Right Upper Arm   Position: Sitting   Cuff Size: Medium Adult   Pulse: 74   Resp: 14   Temp: 97.4 °F (36.3 °C)   TempSrc: Temporal   SpO2: 97%   Weight: 157 lb (71.2 kg)   Height: 5' 2\" (1.575 m)     Body mass index is 28.72 kg/m². Based upon direct observation of the patient, evaluation of cognition reveals recent and remote memory intact. Patient's complete Health Risk Assessment and screening values have been reviewed and are found in Flowsheets. The following problems were reviewed today and where indicated follow up appointments were made and/or referrals ordered. Positive Risk Factor Screenings with Interventions:       General Health and ACP:  General  In general, how would you say your health is?: Good  In the past 7 days, have you experienced any of the following?  New or Increased Pain, New or Increased Fatigue, Loneliness, Social Isolation, Stress or Anger?: None of These  Do you get the social and emotional support that you need?: Yes  Do you have a Living Will?: Yes  Advance Directives     Power of [address] [place] [address] Will ACP-Advance Directive ACP-Power of     Not on File Not on File Not on File Not on File      General Health Risk Interventions:  · Done     Health Habits/Nutrition:  Health Habits/Nutrition  Do you exercise for at least 20 minutes 2-3 times per week?: Yes  Have you lost any weight without trying in the past 3 months?: No  Do you eat fewer than 2 meals per day?: No  Have you seen a dentist within the past year?: (!) No  Body mass index: (!) 28.71  Health Habits/Nutrition Interventions:  · Inadequate physical activity:  educational materials provided to promote increased physical activity    Safety:  Safety  Do you have working smoke detectors?: Yes  Have all throw rugs been removed or fastened?: Yes  Do you have non-slip mats or surfaces in all bathtubs/showers?: Yes  Do all of your stairways have a railing or banister?: (!) No  Are your doorways, halls and stairs free of clutter?: Yes  Do you always fasten your seatbelt when you are in a car?: Yes  Safety Interventions:  · Home safety tips provided  Patient does not have stairs   Personalized Preventive Plan   Current Health Maintenance Status  Immunization History   Administered Date(s) Administered    Influenza, High Dose (Fluzone 65 yrs and older) 11/08/2018    Influenza, High-dose, Quadv, 65 yrs +, IM (Fluzone) 10/16/2020    Influenza, Quadv, IM, PF (6 mo and older Fluzone, Flulaval, Fluarix, and 3 yrs and older Afluria) 10/30/2019    Pneumococcal Conjugate 13-valent ([id number]) 02/01/2016    Pneumococcal Polysaccharide ([id number]) 11/14/2014    Tdap (Boostrix, Adacel) 06/20/2013    Zoster Live (Zostavax) 12/16/2014    Zoster Recombinant (Shingrix) 02/23/2019        Health Maintenance   Topic Date Due    Low dose CT lung screening  10/18/2004    Colon Cancer Screen FIT/FOBT  11/06/2020    Annual Wellness Visit (AWV)  12/04/2021 (Originally 5/29/2019)    Shingles Vaccine (3 of 3) 12/04/2021 (Originally 4/20/2019)    A1C test (Diabetic or Prediabetic)  09/21/2021    Lipid screen  09/21/2021    TSH testing  09/21/2021    Breast cancer screen  09/30/2021    Flu vaccine  Completed    Pneumococcal 65+ years Vaccine  Completed    Hepatitis C screen  Completed    DEXA (modify frequency per FRAX score)  Addressed    Hepatitis A vaccine  Aged Out    Hepatitis B vaccine  Aged Out    Hib vaccine  Aged Out    Meningococcal (ACWY) vaccine  Aged Out     Recommendations for [employer] Due: see orders and patient instructions/AVS.  .   Recommended screening schedule for the next 5-10 years is provided to the patient in written form: see Patient Namrata Pontes was seen today for medicare awv.    Diagnoses and all orders for this visit:    Medicare annual wellness visit, subsequent    Former smoker  -     CT lung screen [Initial/Annual]; Future    Former heavy tobacco smoker  -     CT lung screen [Initial/Annual]; Future    Personal history of tobacco use  -     ME VISIT TO DISCUSS LUNG CA SCREEN W LDCT           IKevin LPN, [id number]/[id number], performed the documented evaluation under the direct supervision of the attending physician. Low Dose CT (LDCT) Lung Screening criteria met   Age 50-69   Pack year smoking >30   Still smoking or less than 15 year since quit   No sign or symptoms of lung cancer   > 11 months since last LDCT     Risks and benefits of lung cancer screening with LDCT scans discussed:    Significance of positive screen - False-positive LDCT results often occur. 95% of all positive results do not lead to a diagnosis of cancer. Usually further imaging can resolve most false-positive results; however, some patients may require invasive procedures. Over diagnosis risk - 10% to 12% of screen-detected lung cancer cases are over diagnosed--that is, the cancer would not have been detected in the patient's lifetime without the screening. Need for follow up screens annually to continue lung cancer screening effectiveness     Risks associated with radiation from annual LDCT- Radiation exposure is about the same as for a mammogram, which is about 1/3 of the annual background radiation exposure from everyday life. Starting screening at age 54 is not likely to increase cancer risk from radiation exposure. Patients with comorbidities resulting in life expectancy of < 10 years, or that would preclude treatment of an abnormality identified on CT, should not be screened due to lack of benefit. To obtain maximal benefit from this screening, smoking cessation and long-term abstinence from smoking is critical      I agree with the above documentation.     Sejal Henry MD

## 2021-01-20 ENCOUNTER — E-VISIT (OUTPATIENT)
Dept: FAMILY MEDICINE CLINIC | Age: 71
End: 2021-01-20
Payer: MEDICARE

## 2021-01-20 ENCOUNTER — TELEPHONE (OUTPATIENT)
Dept: FAMILY MEDICINE CLINIC | Age: 71
End: 2021-01-20

## 2021-01-20 DIAGNOSIS — J01.90 ACUTE NON-RECURRENT SINUSITIS, UNSPECIFIED LOCATION: Primary | ICD-10-CM

## 2021-01-20 PROCEDURE — 99421 OL DIG E/M SVC 5-10 MIN: CPT | Performed by: PEDIATRICS

## 2021-01-20 RX ORDER — GUAIFENESIN 600 MG/1
600 TABLET, EXTENDED RELEASE ORAL 2 TIMES DAILY
Qty: 20 TABLET | Refills: 0 | Status: SHIPPED | OUTPATIENT
Start: 2021-01-20 | End: 2021-01-30

## 2021-01-20 RX ORDER — AMOXICILLIN 500 MG/1
500 CAPSULE ORAL 3 TIMES DAILY
Qty: 30 CAPSULE | Refills: 0 | Status: SHIPPED | OUTPATIENT
Start: 2021-01-20 | End: 2021-01-30

## 2021-01-20 ASSESSMENT — LIFESTYLE VARIABLES
SMOKING_YEARS: 53
PACKS_PER_DAY: 1
SMOKING_STATUS: NO, I'M A FORMER SMOKER

## 2021-01-25 ENCOUNTER — TELEPHONE (OUTPATIENT)
Dept: FAMILY MEDICINE CLINIC | Age: 71
End: 2021-01-25

## 2021-02-11 ENCOUNTER — NURSE TRIAGE (OUTPATIENT)
Dept: OTHER | Facility: CLINIC | Age: 71
End: 2021-02-11

## 2021-02-11 ENCOUNTER — TELEMEDICINE (OUTPATIENT)
Dept: FAMILY MEDICINE CLINIC | Age: 71
End: 2021-02-11
Payer: MEDICARE

## 2021-02-11 DIAGNOSIS — R49.0 HOARSENESS: Primary | ICD-10-CM

## 2021-02-11 PROCEDURE — 3017F COLORECTAL CA SCREEN DOC REV: CPT | Performed by: STUDENT IN AN ORGANIZED HEALTH CARE EDUCATION/TRAINING PROGRAM

## 2021-02-11 PROCEDURE — 1123F ACP DISCUSS/DSCN MKR DOCD: CPT | Performed by: STUDENT IN AN ORGANIZED HEALTH CARE EDUCATION/TRAINING PROGRAM

## 2021-02-11 PROCEDURE — 99213 OFFICE O/P EST LOW 20 MIN: CPT | Performed by: STUDENT IN AN ORGANIZED HEALTH CARE EDUCATION/TRAINING PROGRAM

## 2021-02-11 PROCEDURE — 1036F TOBACCO NON-USER: CPT | Performed by: STUDENT IN AN ORGANIZED HEALTH CARE EDUCATION/TRAINING PROGRAM

## 2021-02-11 PROCEDURE — G8417 CALC BMI ABV UP PARAM F/U: HCPCS | Performed by: STUDENT IN AN ORGANIZED HEALTH CARE EDUCATION/TRAINING PROGRAM

## 2021-02-11 PROCEDURE — 4040F PNEUMOC VAC/ADMIN/RCVD: CPT | Performed by: STUDENT IN AN ORGANIZED HEALTH CARE EDUCATION/TRAINING PROGRAM

## 2021-02-11 PROCEDURE — G8484 FLU IMMUNIZE NO ADMIN: HCPCS | Performed by: STUDENT IN AN ORGANIZED HEALTH CARE EDUCATION/TRAINING PROGRAM

## 2021-02-11 PROCEDURE — G8400 PT W/DXA NO RESULTS DOC: HCPCS | Performed by: STUDENT IN AN ORGANIZED HEALTH CARE EDUCATION/TRAINING PROGRAM

## 2021-02-11 PROCEDURE — G8428 CUR MEDS NOT DOCUMENT: HCPCS | Performed by: STUDENT IN AN ORGANIZED HEALTH CARE EDUCATION/TRAINING PROGRAM

## 2021-02-11 PROCEDURE — 1090F PRES/ABSN URINE INCON ASSESS: CPT | Performed by: STUDENT IN AN ORGANIZED HEALTH CARE EDUCATION/TRAINING PROGRAM

## 2021-02-11 RX ORDER — PREDNISONE 20 MG/1
20 TABLET ORAL DAILY
Qty: 7 TABLET | Refills: 0 | Status: SHIPPED | OUTPATIENT
Start: 2021-02-11 | End: 2021-02-18

## 2021-02-11 RX ORDER — BENZONATATE 200 MG/1
200 CAPSULE ORAL 3 TIMES DAILY PRN
Qty: 30 CAPSULE | Refills: 0 | Status: SHIPPED | OUTPATIENT
Start: 2021-02-11 | End: 2021-02-18

## 2021-02-11 NOTE — TELEPHONE ENCOUNTER
Reason for Disposition   Earache also present    Answer Assessment - Initial Assessment Questions  1. ONSET: \"When did the throat start hurting? \" (Hours or days ago)       Yesterday    2. SEVERITY: \"How bad is the sore throat? \" (Scale 1-10; mild, moderate or severe)    - MILD (1-3):  doesn't interfere with eating or normal activities    - MODERATE (4-7): interferes with eating some solids and normal activities    - SEVERE (8-10):  excruciating pain, interferes with most normal activities    - SEVERE DYSPHAGIA: can't swallow liquids, drooling      Worse today, losing voice, 2/10    3. STREP EXPOSURE: \"Has there been any exposure to strep within the past week? \" If so, ask: \"What type of contact occurred? \"       No    4. VIRAL SYMPTOMS: Fayne Arm there any symptoms of a cold, such as a runny nose, cough, hoarse voice or red eyes? \"       Hoarse voice    5. FEVER: \"Do you have a fever? \" If so, ask: \"What is your temperature, how was it measured, and when did it start? \"      No    6. PUS ON THE TONSILS: \"Is there pus on the tonsils in the back of your throat? \"      No    7. OTHER SYMPTOMS: \"Do you have any other symptoms? \" (e.g., difficulty breathing, headache, rash)      Losing voice    8. PREGNANCY: \"Is there any chance you are pregnant? \" \"When was your last menstrual period? \"      N/A    Protocols used: SORE THROAT-ADULT-OH    Patient called Patience Navarro at [facility])  with red flag complaint. Brief description of triage: sore throat and losing voice, pressure in ears. Triage indicates for patient to be seen today. Care advice provided, patient verbalizes understanding; denies any other questions or concerns; instructed to call back for any new or worsening symptoms. Writer [facility] to [facility] but messaged in the chat for scheduling to call patient back. Instructed patient to go to urgent care if unable to be seen in office today. Attention Provider:   Thank you for allowing me to participate in the care of your patient. The patient was connected to triage in response to information provided to the ECC. Please do not respond through this encounter as the response is not directed to a shared pool.

## 2021-02-11 NOTE — PROGRESS NOTES
Car Noriega (:  1950) is a 69 y.o. female,Established patient, here for evaluation of the following chief complaint(s): No chief complaint on file. ASSESSMENT/PLAN:  1. Hoarseness  -     predniSONE (DELTASONE) 20 MG tablet; Take 1 tablet by mouth daily for 7 days, Disp-7 tablet, R-0Normal  -     benzonatate (TESSALON) 200 MG capsule; Take 1 capsule by mouth 3 times daily as needed for Cough, Disp-30 capsule, R-0Normal    Patient recently treated for sinusitis  Antibiotic course was completed  Following said completion she developed throat hoarseness frequent throat clearing but no cough  She also denied fevers chills night sweats likely this is related to possible laryngitis? Could also be a postnasal drip, could also just be undiagnosed or undertreated GERD which is very frequent because of throat clearing    Sinus infection may just be incidental    She is not interested in acid suppression therapy at this time  I think a short course of steroids and throat lozenges will be beneficial  If this does not improve her symptoms she can discuss it at follow-up appointment with Dr. Allyssa Earl    Has follow-up appointment with Dr. Allyssa Earl     No follow-ups on file.     SUBJECTIVE/OBJECTIVE:  HPI: Recently treated for sinus infection the patient presents for hoarseness and trouble clearing her throat  She is not having any cough shortness of breath fevers chills or night sweats, but she has this persistent hoarseness and she has lost her voice on a couple of occasions    Symptoms are possibly consistent with laryngitis, however in speaking with her on the phone I do not detect a charley laryngitis    She has an upcoming appointment with Dr. Allyssa Earl  Review of Systems 12 point ROS per HPI negative if not noted pertinent positives include hoarseness trouble clearing throat pertinent negatives include no cough no shortness of breath no chest pain no fevers chills night sweats    Patient-Reported Vitals 2/11/2021   Patient-Reported Weight 155   Patient-Reported Height 5'2\"   Patient-Reported Systolic 726   Patient-Reported Diastolic 89   Patient-Reported Pulse unknown   Patient-Reported Temperature 97.2                Dez Vail is a 69 y.o. female being evaluated by a Virtual Visit (video visit) encounter to address concerns as mentioned above. A caregiver was present when appropriate. Due to this being a TeleHealth encounter (During GZXZB-87 public health emergency), evaluation of the following organ systems was limited: Vitals/Constitutional/EENT/Resp/CV/GI//MS/Neuro/Skin/Heme-Lymph-Imm. Pursuant to the emergency declaration under the [address] [place] [address] and the [employer] Act, this Virtual Visit was conducted with patient's (and/or legal guardian's) consent, to reduce the patient's risk of exposure to COVID-19 and provide necessary medical care. The patient (and/or legal guardian) has also been advised to contact this office for worsening conditions or problems, and seek emergency medical treatment and/or call 911 if deemed necessary. Patient identification was verified at the start of the visit: Yes    Services were provided through a video synchronous discussion virtually to substitute for in-person clinic visit. Patient was located at home and provider was located in office or at home. An electronic signature was used to authenticate this note.     --Michelle Pike MD

## 2021-02-25 ENCOUNTER — HOSPITAL ENCOUNTER (OUTPATIENT)
Age: 71
Setting detail: SPECIMEN
Discharge: HOME | End: 2021-02-25
Payer: MEDICARE

## 2021-02-25 ENCOUNTER — OFFICE VISIT (OUTPATIENT)
Dept: FAMILY MEDICINE CLINIC | Age: 71
End: 2021-02-25
Payer: MEDICARE

## 2021-02-25 VITALS
TEMPERATURE: 97.2 F | SYSTOLIC BLOOD PRESSURE: 122 MMHG | BODY MASS INDEX: 28.9 KG/M2 | WEIGHT: 158 LBS | DIASTOLIC BLOOD PRESSURE: 76 MMHG | HEART RATE: 84 BPM | RESPIRATION RATE: 16 BRPM

## 2021-02-25 DIAGNOSIS — Z90.12 HISTORY OF LEFT MASTECTOMY: ICD-10-CM

## 2021-02-25 DIAGNOSIS — Z87.891 HISTORY OF TOBACCO USE: ICD-10-CM

## 2021-02-25 DIAGNOSIS — E78.5 HYPERLIPIDEMIA, UNSPECIFIED HYPERLIPIDEMIA TYPE: ICD-10-CM

## 2021-02-25 DIAGNOSIS — I34.0 MILD MITRAL REGURGITATION BY PRIOR ECHOCARDIOGRAM: ICD-10-CM

## 2021-02-25 DIAGNOSIS — E55.9 VITAMIN D DEFICIENCY: ICD-10-CM

## 2021-02-25 DIAGNOSIS — I51.89 DIASTOLIC DYSFUNCTION: ICD-10-CM

## 2021-02-25 DIAGNOSIS — R49.0 HOARSENESS OF VOICE: ICD-10-CM

## 2021-02-25 DIAGNOSIS — R73.03 PREDIABETES: ICD-10-CM

## 2021-02-25 DIAGNOSIS — R31.29 MICROHEMATURIA: ICD-10-CM

## 2021-02-25 DIAGNOSIS — M81.0 AGE-RELATED OSTEOPOROSIS WITHOUT CURRENT PATHOLOGICAL FRACTURE: ICD-10-CM

## 2021-02-25 DIAGNOSIS — J41.0 SIMPLE CHRONIC BRONCHITIS (HCC): ICD-10-CM

## 2021-02-25 DIAGNOSIS — E03.9 HYPOTHYROIDISM, UNSPECIFIED TYPE: ICD-10-CM

## 2021-02-25 DIAGNOSIS — N60.81 APOCRINE METAPLASIA OF RIGHT BREAST: ICD-10-CM

## 2021-02-25 DIAGNOSIS — R45.89 DEPRESSED MOOD: ICD-10-CM

## 2021-02-25 DIAGNOSIS — E03.9 HYPOTHYROIDISM, UNSPECIFIED TYPE: Primary | ICD-10-CM

## 2021-02-25 DIAGNOSIS — Z85.3 HISTORY OF BREAST CANCER IN FEMALE: ICD-10-CM

## 2021-02-25 LAB
ALBUMIN SERPL-MCNC: 4.3 G/DL (ref 3.5–5.2)
ALBUMIN/GLOBULIN RATIO: 1.4 (ref 1–2.5)
ALP BLD-CCNC: 119 U/L (ref 35–104)
ALT SERPL-CCNC: 20 U/L (ref 5–33)
ANION GAP SERPL CALCULATED.3IONS-SCNC: 15 MMOL/L (ref 9–17)
AST SERPL-CCNC: 19 U/L
BILIRUB SERPL-MCNC: 0.42 MG/DL (ref 0.3–1.2)
BUN BLDV-MCNC: 10 MG/DL (ref 8–23)
BUN/CREAT BLD: ABNORMAL (ref 9–20)
CALCIUM SERPL-MCNC: 10 MG/DL (ref 8.6–10.4)
CHLORIDE BLD-SCNC: 102 MMOL/L (ref 98–107)
CO2: 23 MMOL/L (ref 20–31)
CREAT SERPL-MCNC: 0.93 MG/DL (ref 0.5–0.9)
ESTIMATED AVERAGE GLUCOSE: 137 MG/DL
GFR AFRICAN AMERICAN: >60 ML/MIN
GFR NON-AFRICAN AMERICAN: 59 ML/MIN
GFR SERPL CREATININE-BSD FRML MDRD: ABNORMAL ML/MIN/{1.73_M2}
GFR SERPL CREATININE-BSD FRML MDRD: ABNORMAL ML/MIN/{1.73_M2}
GLUCOSE BLD-MCNC: 113 MG/DL (ref 70–99)
HBA1C MFR BLD: 6.4 % (ref 4–6)
POTASSIUM SERPL-SCNC: 4.4 MMOL/L (ref 3.7–5.3)
SODIUM BLD-SCNC: 140 MMOL/L (ref 135–144)
THYROXINE, FREE: 1.87 NG/DL (ref 0.93–1.7)
TOTAL PROTEIN: 7.4 G/DL (ref 6.4–8.3)
TSH SERPL DL<=0.05 MIU/L-ACNC: 0.05 MIU/L (ref 0.3–5)

## 2021-02-25 PROCEDURE — G8400 PT W/DXA NO RESULTS DOC: HCPCS | Performed by: PEDIATRICS

## 2021-02-25 PROCEDURE — G8427 DOCREV CUR MEDS BY ELIG CLIN: HCPCS | Performed by: PEDIATRICS

## 2021-02-25 PROCEDURE — G8484 FLU IMMUNIZE NO ADMIN: HCPCS | Performed by: PEDIATRICS

## 2021-02-25 PROCEDURE — 3017F COLORECTAL CA SCREEN DOC REV: CPT | Performed by: PEDIATRICS

## 2021-02-25 PROCEDURE — 99214 OFFICE O/P EST MOD 30 MIN: CPT | Performed by: PEDIATRICS

## 2021-02-25 PROCEDURE — G8926 SPIRO NO PERF OR DOC: HCPCS | Performed by: PEDIATRICS

## 2021-02-25 PROCEDURE — 1123F ACP DISCUSS/DSCN MKR DOCD: CPT | Performed by: PEDIATRICS

## 2021-02-25 PROCEDURE — 1036F TOBACCO NON-USER: CPT | Performed by: PEDIATRICS

## 2021-02-25 PROCEDURE — G8417 CALC BMI ABV UP PARAM F/U: HCPCS | Performed by: PEDIATRICS

## 2021-02-25 PROCEDURE — 3023F SPIROM DOC REV: CPT | Performed by: PEDIATRICS

## 2021-02-25 PROCEDURE — 1090F PRES/ABSN URINE INCON ASSESS: CPT | Performed by: PEDIATRICS

## 2021-02-25 PROCEDURE — 4040F PNEUMOC VAC/ADMIN/RCVD: CPT | Performed by: PEDIATRICS

## 2021-02-25 ASSESSMENT — ENCOUNTER SYMPTOMS
EYE REDNESS: 0
BACK PAIN: 0
BLOOD IN STOOL: 0
SINUS PRESSURE: 0
ABDOMINAL PAIN: 0
NAUSEA: 0
DIARRHEA: 0
SORE THROAT: 0
COUGH: 1
ABDOMINAL DISTENTION: 0
EYE PAIN: 0
WHEEZING: 0
PHOTOPHOBIA: 0
COLOR CHANGE: 0
CONSTIPATION: 0
VOICE CHANGE: 1
VOMITING: 0
EYE DISCHARGE: 0
RHINORRHEA: 0
SHORTNESS OF BREATH: 0
TROUBLE SWALLOWING: 0
CHEST TIGHTNESS: 0

## 2021-02-25 NOTE — PROGRESS NOTES
Stefan Springer (:  1950) is a 69 y.o. female,Established patient, here for evaluation of the following chief complaint(s):  Hyperlipidemia and Hypothyroidism      ASSESSMENT/PLAN:    1. Hypothyroidism, unspecified type  Assessment & Plan:  Stable with current medication. Continue same medication and obtain labs as ordered. Obtain thyroid ultrasound as ordered. Orders:  -     US THYROID; Future  2. Hyperlipidemia, unspecified hyperlipidemia type  Assessment & Plan:  Controlled with current medication. Continue same medication, low-cholesterol diet and regular exercise. 3. Prediabetes  Assessment & Plan:  Currently treated with diet and exercise. She has gained weight and is not exercising. Obtain labs as ordered. 4. Age-related osteoporosis without current pathological fracture  Assessment & Plan:  Previously treated with Actonel but not currently taking prescription medication. Continue taking calcium and vitamin D.   5. Vitamin D deficiency  Assessment & Plan:  Stable with vitamin D supplementation. Continue same supplement. 6. Depressed mood  7. History of tobacco use  8. Simple chronic bronchitis ([address]  Assessment & Plan:  Stable. No new change. 9. Microhematuria  Assessment & Plan:  Worked up by urology in the past.  Currently asymptomatic. 10. Diastolic dysfunction  Assessment & Plan:  Found on previous echocardiogram.  Asymptomatic. 11. Mild mitral regurgitation by prior echocardiogram  Assessment & Plan:  Found on previous echocardiogram.  Asymptomatic. 12. History of breast cancer in female  Assessment & Plan:  Of the left breast in  status post left mastectomy, chemotherapy and radiation with endocrine therapy including 4 years of tamoxifen and 2 years of an aromatase inhibitor. 13. History of left mastectomy  Assessment & Plan:  . 14. Apocrine metaplasia of right breast  Assessment & Plan:  Followed by general surgery, Dr. Xiomara Rodriguez.   Due for repeat mammogram and follow-up in March/April. 15. Hoarseness of voice  -     External Referral To ENT  -     US THYROID; Future      Proceed with continue current medications  Obtain labs as ordered  Healthy low-cholesterol, low carbohydrate diet  Continue calcium and vitamin D supplementation  Weightbearing exercise recommended  Monitor depressed mood for worsening  Obtain CT lung screen  Follow-up with urology as needed  No cardiac limitations  Follow-up with Dr. Gopi Whitten as scheduled for repeat right breast mammogram in Mar/April  Proceed with referral to ENT for further evaluation of hoarseness  Consider GI referral   Consider PPI  Obtain thyroid ultrasound  Call with concerns      Return in about 6 months (around 8/25/2021) for annual well visit. SUBJECTIVE/OBJECTIVE:    HPI    Patient presents today for routine follow-up of her chronic medical problems which include hypothyroidism, hyperlipidemia, prediabetes, osteoporosis with vitamin D deficiency, irritable mood, history of tobacco use, chronic bronchitis and microhematuria. She also has a history of diastolic dysfunction, mild mitral regurgitation, breast cancer of the left breast status post mastectomy. She does continue to take her thyroid medication as prescribed. She is due for repeat labs. She does continue to take Zocor for her history of hyperlipidemia. She is tolerating this without side effects. She does have a history of an impaired fasting blood sugar and prediabetes with her last hemoglobin A1c being 6.4. She is due for repeat labs. She is somewhat concerned about this because she has gained weight and she has not changed her eating pattern. She did get a treadmill and rowing machine that she is going to start using. She does have a history of osteoporosis and vitamin D deficiency for which she takes calcium and vitamin D supplementation. She does not wish to have a DEXA scan done at this time.   She did previously take Actonel for some time but does not want to take any medications. She was encouraged to do weightbearing exercise regularly and to continue her vitamin D and Calcium supplement. She was previously prescribed Zoloft for her history of irritable mood secondary to smoking cessation but she never took this medication. She states that she does get an occasional depressed mood at times because of Covid and winter however she does feel as though she is doing okay with this. She does not wish to take any medication or seek counseling. She does have a history of chronic bronchitis secondary to her history of smoking. She quit smoking several years ago. We did discuss a CT lung screen and she still has yet to get this scheduled. She does have a history of microhematuria has has been worked up by urology in the past.  She does have a history of a heart murmur that was worked up with echocardiogram and stress test.  Her echocardiogram did show some mild diastolic dysfunction and mild mitral gravitation. She did see cardiology and they signed off from a cardiac standpoint. She does have a history of breast cancer or the right breast in 2002 that was treated with mastectomy, chemo, radiation and endocrine therapy. She took 4 years of tamoxifen and 2 years of an aromatase inhibitor. She then did see Dr. Iglesia Christy in October 2020 in consultation regarding a change in her mammogram that showed an irregular area on the right breast requiring biopsy. This biopsy ultimately showed a right breast apocrine metaplasia. Dr. Iglesia Christy did tell her that this was a benign lesion and some people do choose to have it removed however she did not want to have anything done at this time. She will follow up with Dr. Iglesia Christy after having a mammogram in March or April. She was recently treated for a sinus infection with antibiotic and then developed hoarseness after the sinus infection was cleared.   She was seen by a provider in the office via virtual visit and was prescribed steroids and throat lozenges. Her sinus symptoms eventually did get better however her hoarseness has continued. She states the hoarseness comes and goes and it is not painful. She states that she is able to swallow without difficulty. She only feels like she is constantly trying to clear her throat. She denies any significant heartburn other than occasional.  She does report a lot of belching but otherwise no other GI symptoms. She did get her first Covid Moderna vaccine on 2/16/2021. cmw      Review of Systems   Constitutional: Positive for fatigue. Negative for appetite change, chills, fever and unexpected weight change. HENT: Positive for voice change (hoarse voice). Negative for congestion, ear discharge, ear pain, nosebleeds, postnasal drip, rhinorrhea, sinus pressure, sore throat, tinnitus and trouble swallowing. Recent sinus infection now resolved   Eyes: Negative for photophobia, pain, discharge, redness and visual disturbance. Respiratory: Positive for cough (occasional ). Negative for chest tightness, shortness of breath and wheezing. Cardiovascular: Negative for chest pain, palpitations and leg swelling. Gastrointestinal: Negative for abdominal distention, abdominal pain, blood in stool, constipation, diarrhea, nausea and vomiting. Endocrine: Negative for polydipsia, polyphagia and polyuria. History of hypothyroidism controlled with medication   Genitourinary: Negative for decreased urine volume, difficulty urinating, dysuria, flank pain, frequency, hematuria, pelvic pain, urgency and vaginal discharge. History of left breast cancer   Musculoskeletal: Positive for arthralgias (occasional). Negative for back pain and myalgias. Skin: Negative for color change and rash. Allergic/Immunologic: Negative for immunocompromised state. Neurological: Negative for dizziness, syncope, weakness, light-headedness and headaches.    Hematological: Negative for adenopathy. Psychiatric/Behavioral: Positive for dysphoric mood (a little sad). Negative for agitation, behavioral problems, confusion and sleep disturbance. The patient is not nervous/anxious. Physical Exam  Vitals signs and nursing note reviewed. Constitutional:       General: She is not in acute distress. Appearance: Normal appearance. She is well-developed. She is not ill-appearing, toxic-appearing or diaphoretic. HENT:      Head: Normocephalic. Comments: Intermittent hoarseness of voice     Right Ear: Tympanic membrane, ear canal and external ear normal. No middle ear effusion. There is no impacted cerumen. Left Ear: Tympanic membrane, ear canal and external ear normal.  No middle ear effusion. There is no impacted cerumen. Nose: Mucosal edema present. No congestion or rhinorrhea. Mouth/Throat:      Mouth: Mucous membranes are moist.      Pharynx: Uvula midline. No oropharyngeal exudate or posterior oropharyngeal erythema. Eyes:      General: No scleral icterus. Right eye: No discharge. Left eye: No discharge. Extraocular Movements: Extraocular movements intact. Conjunctiva/sclera: Conjunctivae normal.      Pupils: Pupils are equal, round, and reactive to light. Neck:      Musculoskeletal: Normal range of motion and neck supple. Thyroid: No thyromegaly. Vascular: No JVD. Cardiovascular:      Rate and Rhythm: Normal rate and regular rhythm. Heart sounds: Murmur present. Systolic murmur present with a grade of 2/6. Pulmonary:      Effort: Pulmonary effort is normal. No respiratory distress. Breath sounds: No stridor. No decreased breath sounds, wheezing, rhonchi or rales. Abdominal:      General: Bowel sounds are normal. There is no distension. Palpations: Abdomen is soft. There is no mass. Tenderness: There is no abdominal tenderness. There is no right CVA tenderness, left CVA tenderness or guarding. Hernia: There is no hernia in the left inguinal area. Genitourinary:     Exam position: Supine. Labia:         Right: No rash, tenderness or lesion. Left: No rash, tenderness or lesion. Vagina: Normal.      Cervix: No cervical motion tenderness, discharge or friability. Adnexa:         Right: No mass, tenderness or fullness. Left: No mass, tenderness or fullness. Musculoskeletal:      Right lower leg: No edema. Left lower leg: No edema. Lymphadenopathy:      Cervical: No cervical adenopathy. Lower Body: No right inguinal adenopathy. No left inguinal adenopathy. Skin:     General: Skin is warm. Capillary Refill: Capillary refill takes less than 2 seconds. Coloration: Skin is not pale. Findings: No erythema or rash. Neurological:      General: No focal deficit present. Mental Status: She is alert and oriented to person, place, and time. Cranial Nerves: No cranial nerve deficit. Motor: No abnormal muscle tone. Coordination: Coordination normal.      Gait: Gait normal.      Deep Tendon Reflexes: Reflexes are normal and symmetric. Psychiatric:         Mood and Affect: Mood normal. Mood is not anxious or depressed. Speech: Speech normal.         Behavior: Behavior normal.         Thought Content: Thought content normal.         Judgment: Judgment normal.           On this date 02/25/21 I have spent greater than 30 minutes reviewing previous notes, test results and face to face with the patient discussing the diagnosis and importance of compliance with the treatment plan as well as documenting on the day of the visit. An electronic signature was used to authenticate this note.     --Raquel Wu MD

## 2021-02-26 DIAGNOSIS — R49.0 HOARSENESS OF VOICE: ICD-10-CM

## 2021-02-26 DIAGNOSIS — E03.9 HYPOTHYROIDISM, UNSPECIFIED TYPE: ICD-10-CM

## 2021-02-26 NOTE — ASSESSMENT & PLAN NOTE
Currently treated with diet and exercise. She has gained weight and is not exercising. Obtain labs as ordered.

## 2021-02-26 NOTE — ASSESSMENT & PLAN NOTE
Of the left breast in 2002 status post left mastectomy, chemotherapy and radiation with endocrine therapy including 4 years of tamoxifen and 2 years of an aromatase inhibitor.

## 2021-02-26 NOTE — ASSESSMENT & PLAN NOTE
Stable with current medication. Continue same medication and obtain labs as ordered. Obtain thyroid ultrasound as ordered.

## 2021-02-26 NOTE — ASSESSMENT & PLAN NOTE
Followed by general surgery, Dr. Dalia Bolaños. Due for repeat mammogram and follow-up in March/April.

## 2021-02-26 NOTE — ASSESSMENT & PLAN NOTE
Previously treated with Actonel but not currently taking prescription medication.   Continue taking calcium and vitamin D.

## 2021-02-26 NOTE — ASSESSMENT & PLAN NOTE
Controlled with current medication. Continue same medication, low-cholesterol diet and regular exercise.

## 2021-03-03 DIAGNOSIS — E78.5 HYPERLIPIDEMIA, UNSPECIFIED HYPERLIPIDEMIA TYPE: ICD-10-CM

## 2021-03-03 DIAGNOSIS — R73.03 PREDIABETES: ICD-10-CM

## 2021-03-03 DIAGNOSIS — E03.9 HYPOTHYROIDISM, UNSPECIFIED TYPE: Primary | ICD-10-CM

## 2021-03-03 RX ORDER — LEVOTHYROXINE SODIUM 88 UG/1
88 TABLET ORAL DAILY
Qty: 90 TABLET | Refills: 1 | Status: SHIPPED | OUTPATIENT
Start: 2021-03-03 | End: 2021-08-23

## 2021-03-08 ENCOUNTER — OFFICE VISIT (OUTPATIENT)
Dept: PRIMARY CARE CLINIC | Age: 71
End: 2021-03-08
Payer: MEDICARE

## 2021-03-08 VITALS
RESPIRATION RATE: 18 BRPM | WEIGHT: 158.4 LBS | BODY MASS INDEX: 28.07 KG/M2 | DIASTOLIC BLOOD PRESSURE: 74 MMHG | TEMPERATURE: 97.8 F | SYSTOLIC BLOOD PRESSURE: 134 MMHG | OXYGEN SATURATION: 97 % | HEART RATE: 88 BPM | HEIGHT: 63 IN

## 2021-03-08 DIAGNOSIS — B02.9 HERPES ZOSTER WITHOUT COMPLICATION: Primary | ICD-10-CM

## 2021-03-08 PROCEDURE — 1036F TOBACCO NON-USER: CPT | Performed by: NURSE PRACTITIONER

## 2021-03-08 PROCEDURE — G8427 DOCREV CUR MEDS BY ELIG CLIN: HCPCS | Performed by: NURSE PRACTITIONER

## 2021-03-08 PROCEDURE — G8484 FLU IMMUNIZE NO ADMIN: HCPCS | Performed by: NURSE PRACTITIONER

## 2021-03-08 PROCEDURE — 4040F PNEUMOC VAC/ADMIN/RCVD: CPT | Performed by: NURSE PRACTITIONER

## 2021-03-08 PROCEDURE — G8417 CALC BMI ABV UP PARAM F/U: HCPCS | Performed by: NURSE PRACTITIONER

## 2021-03-08 PROCEDURE — 1123F ACP DISCUSS/DSCN MKR DOCD: CPT | Performed by: NURSE PRACTITIONER

## 2021-03-08 PROCEDURE — 3017F COLORECTAL CA SCREEN DOC REV: CPT | Performed by: NURSE PRACTITIONER

## 2021-03-08 PROCEDURE — 1090F PRES/ABSN URINE INCON ASSESS: CPT | Performed by: NURSE PRACTITIONER

## 2021-03-08 PROCEDURE — 99213 OFFICE O/P EST LOW 20 MIN: CPT | Performed by: NURSE PRACTITIONER

## 2021-03-08 PROCEDURE — G8400 PT W/DXA NO RESULTS DOC: HCPCS | Performed by: NURSE PRACTITIONER

## 2021-03-08 RX ORDER — VALACYCLOVIR HYDROCHLORIDE 1 G/1
1000 TABLET, FILM COATED ORAL 3 TIMES DAILY
Qty: 21 TABLET | Refills: 0 | Status: SHIPPED | OUTPATIENT
Start: 2021-03-08 | End: 2021-03-15

## 2021-03-08 ASSESSMENT — ENCOUNTER SYMPTOMS
COUGH: 0
SHORTNESS OF BREATH: 0

## 2021-03-08 NOTE — PROGRESS NOTES
[address] 58 [place]   [address] [place]  [facility] [address]  Dept: [address] Ania is a 69 y.o. female Established patient, who presents to the walk-in clinic today with conditions/complaints as noted below:    Chief Complaint   Patient presents with    Rash     left arm started this morning         HPI:     Patient presents to walk in clinic with concerns about rash to her left arm that started this morning. Rash extends from wrist to her elbow. No new lotions or detergents. Rash is itchy. Her left elbow is sore. She did apply calamine lotion to her arm. Rash  This is a new problem. The current episode started today. The affected locations include the left arm and left elbow. She was exposed to nothing. Pertinent negatives include no cough, fatigue or shortness of breath. Past treatments include nothing. Past Medical History:   Diagnosis Date    Breast cancer ([facility] [address]     left modified radical mastectomy    Hematuria     Herniated disc     Hyperlipidemia     Hypertension     Hypothyroidism     Kidney stones     Mumps     Other osteoporosis        Current Outpatient Medications   Medication Sig Dispense Refill    valACYclovir (VALTREX) 1 g tablet Take 1 tablet by mouth 3 times daily for 7 days 21 tablet 0    levothyroxine (EUTHYROX) 88 MCG tablet Take 1 tablet by mouth Daily 90 tablet 1    simvastatin (ZOCOR) 40 MG tablet Take 1 tablet by mouth nightly 90 tablet 1    VITAMIN D, CHOLECALCIFEROL, PO Take 4,000 Units by mouth daily       No current facility-administered medications for this visit. Allergies   Allergen Reactions    Bactrim [Sulfamethoxazole-Trimethoprim] Hives    Tetanus Toxoids Itching and Swelling       :     Review of Systems   Constitutional: Negative for fatigue. Respiratory: Negative for cough and shortness of breath. Skin: Positive for rash. Neurological: Negative for headaches.    Psychiatric/Behavioral: Negative for

## 2021-03-08 NOTE — PATIENT INSTRUCTIONS
Patient Education        Shingles: Care Instructions  Your Care Instructions     Shingles (herpes zoster) causes pain and a blistered rash. The rash can appear anywhere on the body but will be on only one side of the body, the left or right. It will be in a band, a strip, or a small area. The pain can be very severe. Shingles can also cause tingling or itching in the area of the rash. The blisters scab over after a few days and heal in 2 to 4 weeks. Medicines can help you feel better and may help prevent more serious problems caused by shingles. Shingles is caused by the same virus that causes chickenpox. When you have chickenpox, the virus gets into your nerve roots and stays there (becomes dormant) long after you get over the chickenpox. If the virus becomes active again, it can cause shingles. Follow-up care is a key part of your treatment and safety. Be sure to make and go to all appointments, and call your doctor if you are having problems. It's also a good idea to know your test results and keep a list of the medicines you take. How can you care for yourself at home? · Be safe with medicines. Take your medicines exactly as prescribed. Call your doctor if you think you are having a problem with your medicine. Antiviral medicine helps you get better faster. · Try not to scratch or pick at the blisters. They will crust over and fall off on their own if you leave them alone. · Put cool, wet cloths on the area to relieve pain and itching. You can also use calamine lotion. Try not to use so much lotion that it cakes and is hard to get off. · Put cornstarch or baking soda on the sores to help dry them out so they heal faster. · Do not use thick ointment, such as petroleum jelly, on the sores. This will keep them from drying and healing. · To help remove loose crusts, soak them in tap water. This can help decrease oozing, and dry and soothe the skin.   · Take an over-the-counter pain medicine, such as acetaminophen (Tylenol), ibuprofen (Advil, Motrin), or naproxen (Aleve). Read and follow all instructions on the label. · Avoid close contact with people until the blisters have healed. It is very important for you to avoid contact with anyone who has never had chickenpox or the chickenpox vaccine. Pregnant women, young babies, and anyone else who has a hard time fighting infection (such as someone with HIV, diabetes, or cancer) is especially at risk. When should you call for help? Call your doctor now or seek immediate medical care if:    · You have a new or higher fever.     · You have a severe headache and a stiff neck.     · You lose the ability to think clearly.     · The rash spreads to your forehead, nose, eyes, or eyelids.     · You have eye pain, or your vision gets worse.     · You have new pain in your face, or you cannot move the muscles in your face.     · Blisters spread to new parts of your body. Watch closely for changes in your health, and be sure to contact your doctor if:    · The rash has not healed after 2 to 4 weeks.     · You still have pain after the rash has healed. Where can you learn more? Go to [email or web address] org and sign in to your [employer] account. Sakshi Walker in the [facility] box to learn more about \"Shingles: Care Instructions. \"     If you do not have an account, please click on the \"Sign Up Now\" link. Current as of: February 11, 2020               Content Version: 12.6  © 2006-2020 [employer]. Care instructions adapted under license by [facility] ([facility]). If you have questions about a medical condition or this instruction, always ask your healthcare professional. [address] any warranty or liability for your use of this information.

## 2021-04-08 RX ORDER — SIMVASTATIN 40 MG
40 TABLET ORAL NIGHTLY
Qty: 90 TABLET | Refills: 1 | Status: SHIPPED | OUTPATIENT
Start: 2021-04-08 | End: 2021-11-30 | Stop reason: SDUPTHER

## 2021-04-08 NOTE — TELEPHONE ENCOUNTER
[facility]            Patient Active Problem List:     History of breast cancer in female     History of left mastectomy     Hypothyroid     Hyperlipidemia     Microhematuria     Vitamin D deficiency     Simple chronic bronchitis (HCC)     Mild mitral regurgitation by prior echocardiogram     Diastolic dysfunction     Former smoker     Prediabetes     Age-related osteoporosis without current pathological fracture     Apocrine metaplasia of right breast

## 2021-08-23 RX ORDER — LEVOTHYROXINE SODIUM 88 UG/1
88 TABLET ORAL DAILY
Qty: 90 TABLET | Refills: 1 | Status: SHIPPED | OUTPATIENT
Start: 2021-08-23 | End: 2021-11-30 | Stop reason: SDUPTHER

## 2021-08-23 NOTE — TELEPHONE ENCOUNTER
LOV 2-25-21  LRF 3-3-21    Health Maintenance   Topic Date Due    Low dose CT lung screening  Never done    Colon Cancer Screen FIT/FOBT  11/06/2020    COVID-19 Vaccine (2 - Moderna 2-dose series) 03/16/2021    Shingles Vaccine (3 of 3) 12/04/2021 (Originally 4/20/2019)    Flu vaccine (1) 09/01/2021    Lipid screen  09/21/2021    Annual Wellness Visit (AWV)  12/05/2021    A1C test (Diabetic or Prediabetic)  02/25/2022    TSH testing  02/25/2022    Breast cancer screen  04/16/2022    Pneumococcal 65+ years Vaccine  Completed    Hepatitis C screen  Completed    DEXA (modify frequency per FRAX score)  Addressed    Hepatitis A vaccine  Aged Out    Hepatitis B vaccine  Aged Out    Hib vaccine  Aged Out    Meningococcal (ACWY) vaccine  Aged Out             (applicable per patient's age: Cancer Screenings, Depression Screening, Fall Risk Screening, Immunizations)    Hemoglobin A1C (%)   Date Value   02/25/2021 6.4 (H)   09/21/2020 6.4 (H)   09/26/2019 6.0     LDL Cholesterol (mg/dL)   Date Value   09/21/2020 74     AST (U/L)   Date Value   02/25/2021 19     ALT (U/L)   Date Value   02/25/2021 20     BUN (mg/dL)   Date Value   02/25/2021 10      (goal A1C is < 7)   (goal LDL is <100) need 30-50% reduction from baseline     BP Readings from Last 3 Encounters:   03/08/21 134/74   02/25/21 122/76   12/04/20 139/82    (goal /80)      All Future Testing planned in [facility]:  Lab Frequency Next Occurrence   POCT Fecal Immunochemical Test (FIT) Once 09/14/2020   US BREAST BIOPSY W LOC DEVICE 1ST LESION RIGHT Once 10/11/2020   CT lung screen [Initial/Annual] Once 12/04/2020   TSH without Reflex Once 06/03/2022   T4, Free Once 06/03/2022   Hemoglobin A1C Once 06/03/2022   Comprehensive Metabolic Panel Once 30/14/4898   AJIT DIAGNOSTIC W CAD RIGHT Once 04/15/2021       Next Visit Date:  Future Appointments   Date Time Provider Nicole Kelly   8/27/2021  9:20 AM MD Luca Klely [id number] [facility] Patient Active Problem List:     History of breast cancer in female     History of left mastectomy     Hypothyroid     Hyperlipidemia     Microhematuria     Vitamin D deficiency     Simple chronic bronchitis (HCC)     Mild mitral regurgitation by prior echocardiogram     Diastolic dysfunction     Former smoker     Prediabetes     Age-related osteoporosis without current pathological fracture     Apocrine metaplasia of right breast

## 2021-08-27 ENCOUNTER — HOSPITAL ENCOUNTER (OUTPATIENT)
Age: 71
Setting detail: SPECIMEN
Discharge: HOME | End: 2021-08-27
Payer: MEDICARE

## 2021-08-27 ENCOUNTER — OFFICE VISIT (OUTPATIENT)
Dept: FAMILY MEDICINE CLINIC | Age: 71
End: 2021-08-27
Payer: MEDICARE

## 2021-08-27 VITALS
SYSTOLIC BLOOD PRESSURE: 122 MMHG | TEMPERATURE: 96.6 F | WEIGHT: 150.6 LBS | DIASTOLIC BLOOD PRESSURE: 82 MMHG | BODY MASS INDEX: 27.11 KG/M2 | HEART RATE: 72 BPM | RESPIRATION RATE: 14 BRPM

## 2021-08-27 DIAGNOSIS — Z87.891 HISTORY OF TOBACCO USE: ICD-10-CM

## 2021-08-27 DIAGNOSIS — E03.9 HYPOTHYROIDISM, UNSPECIFIED TYPE: ICD-10-CM

## 2021-08-27 DIAGNOSIS — E78.5 HYPERLIPIDEMIA, UNSPECIFIED HYPERLIPIDEMIA TYPE: ICD-10-CM

## 2021-08-27 DIAGNOSIS — N60.81 APOCRINE METAPLASIA OF RIGHT BREAST: ICD-10-CM

## 2021-08-27 DIAGNOSIS — Z90.12 HISTORY OF LEFT MASTECTOMY: ICD-10-CM

## 2021-08-27 DIAGNOSIS — E03.9 HYPOTHYROIDISM, UNSPECIFIED TYPE: Primary | ICD-10-CM

## 2021-08-27 DIAGNOSIS — R45.89 DEPRESSED MOOD: ICD-10-CM

## 2021-08-27 DIAGNOSIS — E55.9 VITAMIN D DEFICIENCY: ICD-10-CM

## 2021-08-27 DIAGNOSIS — I34.0 MILD MITRAL REGURGITATION BY PRIOR ECHOCARDIOGRAM: ICD-10-CM

## 2021-08-27 DIAGNOSIS — R49.0 HOARSENESS: ICD-10-CM

## 2021-08-27 DIAGNOSIS — R73.03 PREDIABETES: ICD-10-CM

## 2021-08-27 DIAGNOSIS — M81.0 AGE-RELATED OSTEOPOROSIS WITHOUT CURRENT PATHOLOGICAL FRACTURE: ICD-10-CM

## 2021-08-27 DIAGNOSIS — I51.89 DIASTOLIC DYSFUNCTION: ICD-10-CM

## 2021-08-27 DIAGNOSIS — J41.0 SIMPLE CHRONIC BRONCHITIS (HCC): ICD-10-CM

## 2021-08-27 DIAGNOSIS — Z85.3 HISTORY OF BREAST CANCER IN FEMALE: ICD-10-CM

## 2021-08-27 DIAGNOSIS — R31.29 MICROHEMATURIA: ICD-10-CM

## 2021-08-27 LAB
ALBUMIN SERPL-MCNC: 4.6 G/DL (ref 3.5–5.2)
ALBUMIN/GLOBULIN RATIO: 1.6 (ref 1–2.5)
ALP BLD-CCNC: 130 U/L (ref 35–104)
ALT SERPL-CCNC: 16 U/L (ref 5–33)
ANION GAP SERPL CALCULATED.3IONS-SCNC: 19 MMOL/L (ref 9–17)
AST SERPL-CCNC: 19 U/L
BILIRUB SERPL-MCNC: 0.55 MG/DL (ref 0.3–1.2)
BUN BLDV-MCNC: 18 MG/DL (ref 8–23)
BUN/CREAT BLD: ABNORMAL (ref 9–20)
CALCIUM SERPL-MCNC: 9.5 MG/DL (ref 8.6–10.4)
CHLORIDE BLD-SCNC: 102 MMOL/L (ref 98–107)
CO2: 20 MMOL/L (ref 20–31)
CREAT SERPL-MCNC: 1.12 MG/DL (ref 0.5–0.9)
ESTIMATED AVERAGE GLUCOSE: 126 MG/DL
GFR AFRICAN AMERICAN: 58 ML/MIN
GFR NON-AFRICAN AMERICAN: 48 ML/MIN
GFR SERPL CREATININE-BSD FRML MDRD: ABNORMAL ML/MIN/{1.73_M2}
GFR SERPL CREATININE-BSD FRML MDRD: ABNORMAL ML/MIN/{1.73_M2}
GLUCOSE BLD-MCNC: 88 MG/DL (ref 70–99)
HBA1C MFR BLD: 6 % (ref 4–6)
POTASSIUM SERPL-SCNC: 4.6 MMOL/L (ref 3.7–5.3)
SODIUM BLD-SCNC: 141 MMOL/L (ref 135–144)
THYROXINE, FREE: 1.68 NG/DL (ref 0.93–1.7)
TOTAL PROTEIN: 7.4 G/DL (ref 6.4–8.3)
TSH SERPL DL<=0.05 MIU/L-ACNC: 0.19 MIU/L (ref 0.3–5)
VITAMIN D 25-HYDROXY: 52.6 NG/ML (ref 30–100)

## 2021-08-27 PROCEDURE — 4040F PNEUMOC VAC/ADMIN/RCVD: CPT | Performed by: PEDIATRICS

## 2021-08-27 PROCEDURE — 3017F COLORECTAL CA SCREEN DOC REV: CPT | Performed by: PEDIATRICS

## 2021-08-27 PROCEDURE — 1123F ACP DISCUSS/DSCN MKR DOCD: CPT | Performed by: PEDIATRICS

## 2021-08-27 PROCEDURE — 3023F SPIROM DOC REV: CPT | Performed by: PEDIATRICS

## 2021-08-27 PROCEDURE — 1090F PRES/ABSN URINE INCON ASSESS: CPT | Performed by: PEDIATRICS

## 2021-08-27 PROCEDURE — G8400 PT W/DXA NO RESULTS DOC: HCPCS | Performed by: PEDIATRICS

## 2021-08-27 PROCEDURE — 99214 OFFICE O/P EST MOD 30 MIN: CPT | Performed by: PEDIATRICS

## 2021-08-27 PROCEDURE — G8926 SPIRO NO PERF OR DOC: HCPCS | Performed by: PEDIATRICS

## 2021-08-27 PROCEDURE — 1036F TOBACCO NON-USER: CPT | Performed by: PEDIATRICS

## 2021-08-27 PROCEDURE — G8417 CALC BMI ABV UP PARAM F/U: HCPCS | Performed by: PEDIATRICS

## 2021-08-27 PROCEDURE — G8427 DOCREV CUR MEDS BY ELIG CLIN: HCPCS | Performed by: PEDIATRICS

## 2021-08-27 SDOH — ECONOMIC STABILITY: FOOD INSECURITY: WITHIN THE PAST 12 MONTHS, YOU WORRIED THAT YOUR FOOD WOULD RUN OUT BEFORE YOU GOT MONEY TO BUY MORE.: NEVER TRUE

## 2021-08-27 SDOH — ECONOMIC STABILITY: FOOD INSECURITY: WITHIN THE PAST 12 MONTHS, THE FOOD YOU BOUGHT JUST DIDN'T LAST AND YOU DIDN'T HAVE MONEY TO GET MORE.: NEVER TRUE

## 2021-08-27 ASSESSMENT — ENCOUNTER SYMPTOMS
BLOOD IN STOOL: 0
DIARRHEA: 0
COLOR CHANGE: 0
CHEST TIGHTNESS: 0
TROUBLE SWALLOWING: 0
PHOTOPHOBIA: 0
RHINORRHEA: 0
ABDOMINAL PAIN: 0
EYE PAIN: 0
COUGH: 1
VOICE CHANGE: 1
SHORTNESS OF BREATH: 0
SORE THROAT: 0
WHEEZING: 0
CONSTIPATION: 0
EYE REDNESS: 0
ABDOMINAL DISTENTION: 0
NAUSEA: 0
BACK PAIN: 0
SINUS PRESSURE: 0
VOMITING: 0
EYE DISCHARGE: 0

## 2021-08-27 ASSESSMENT — SOCIAL DETERMINANTS OF HEALTH (SDOH): HOW HARD IS IT FOR YOU TO PAY FOR THE VERY BASICS LIKE FOOD, HOUSING, MEDICAL CARE, AND HEATING?: NOT HARD AT ALL

## 2021-08-27 NOTE — PROGRESS NOTES
Lamar Thrasher (:  1950) is a 69 y.o. female,Established patient, here for evaluation of the following chief complaint(s):  Hyperlipidemia and Hypothyroidism         ASSESSMENT/PLAN:    1. Hypothyroidism, unspecified type  2. Hyperlipidemia, unspecified hyperlipidemia type  3. Prediabetes  4. Age-related osteoporosis without current pathological fracture  -     Vitamin D 25 Hydroxy; Future  5. Vitamin D deficiency  -     Vitamin D 25 Hydroxy; Future  6. Depressed mood  7. History of tobacco use  8. Simple chronic bronchitis ([address]  9. Microhematuria  10. Diastolic dysfunction  11. Mild mitral regurgitation by prior echocardiogram  12. History of breast cancer in female  15. History of left mastectomy  14. Apocrine metaplasia of right breast  15. Hoarseness      Proceed with continue current medications  Obtain labs as ordered   Recommend healthy low cholesterol, low carbohydrate diet with regular exercise  Continue calcium and vitamin D supplementation   Weight bearing exercises recommended  Monitor depressed mood for worsening   Follow up urology as needed   Up with cardiology as needed  Yearly right mammogram  Follow-up with breast surgeon as needed  Follow-up with ENT as needed  Complete fit test that she has at home  Obtain second shingles date from [facility] for chart completion  Call with concerns         Return in about 6 months (around 2022) for routine follow up. Subjective     HPI    Patient presents today for routine follow up of her chronic medical problems which include hypothyroidism, hyperlipidemia, prediabetes, osteoporosis with vitamin D deficiency, irritable mood, history of tobacco use, chronic bronchitis and microhematuria.   She also has a history of diastolic dysfunction, mild mitral regurgitation, breast cancer of the left breast status post left mastectomy and a history of apocrine metaplasia of the right breast.    She states overall she has been doing fairly well but she has been very stressed. Her son had an accident at work and is now paralyzed and living with her. She and her  are taking care of him. She does have a history of hypothyroidism and is taking her medication as prescribed. She is due for repeat labs. She does continue to take Zocor for her hyperlipidemia. She is tolerating this without side effects. She does have a history of an impaired fasting blood sugar and prediabetes with the last hemoglobin A1c being 6.4. She is due for repeat labs today. She has lost 8 pounds since her last visit and she tells me that she was walking every day prior to her son having his accident. She does have a history of osteoporosis and vitamin D deficiency for which she takes calcium and vitamin D supplementation. She does not wish to have a DEXA scan done at this time. She was previously taking Actonel for some time but does not wish to take any medications other than her supplements. She was previously taking 4000 IUs of vitamin D 3 but did decrease this to 2000 IUs several months ago. She does have a history of depressed mood and irritable mood after she quit smoking several years ago. She states that she still does get an occasional depressed mood and irritability at times but overall she is doing okay with this. Even with the stress from her son being in an accident and taking care of him she does not report any major depression or anxiety. She does have a history of chronic bronchitis secondary to her history of smoking. She quit smoking several years ago. She states that overall her cough does seem to continue to improve over time. She does have history of microhematuria that has been worked up by urology in the past.  She has no gross hematuria. She does have a history of a heart murmur that was worked up with an echocardiogram and stress test.  Her echocardiogram did show some mild diastolic dysfunction and mild mitral regurgitation.   She did see cardiology and they signed off as there was nothing concerning from a cardiac standpoint. She does have a history of breast cancer in the right breast in 2002 that was treated with mastectomy, chemo, radiation and endocrine therapy. She took 4 years of tamoxifen and 2 years of an aromatase inhibitor. She saw Dr. Patsy Roe in October 2020 consultation regarding a change in her mammogram that showed an irregular area on the right breast that required biopsy. The biopsy ultimately showed a right breast apocrine metaplasia. Dr. Patsy Roe did tell her that this was a benign lesion and some people do choose to have it removed however she did not want to have anything done at this time. She does continue to have some intermittent hoarseness which was worked up by ENT. She had a flexible laryngoscopy that was normal.  She just feels like she has to clear her throat all the time. ENT told her this was probably because of her smoking history. She is not concerned with this. She has no other concerns at this time. cmw      Review of Systems   Constitutional: Positive for fatigue. Negative for appetite change, chills, fever and unexpected weight change. Has lost 8 lbs since her last visit   HENT: Positive for voice change (hoarse voice intermittently with frequent clearing of the throat). Negative for congestion, ear discharge, ear pain, nosebleeds, postnasal drip, rhinorrhea, sinus pressure, sore throat, tinnitus and trouble swallowing. Eyes: Negative for photophobia, pain, discharge, redness and visual disturbance. Respiratory: Positive for cough (only occasional). Negative for chest tightness, shortness of breath and wheezing. Cardiovascular: Negative for chest pain, palpitations and leg swelling. Gastrointestinal: Negative for abdominal distention, abdominal pain, blood in stool, constipation, diarrhea, nausea and vomiting. Endocrine: Negative for polydipsia, polyphagia and polyuria. History of hypothyroidism controlled with medication   Genitourinary: Negative for decreased urine volume, difficulty urinating, dysuria, flank pain, frequency, hematuria, pelvic pain, urgency and vaginal discharge. History of left breast cancer   Musculoskeletal: Positive for arthralgias (occasional). Negative for back pain and myalgias. Skin: Negative for color change and rash. Allergic/Immunologic: Negative for immunocompromised state. Neurological: Negative for dizziness, syncope, weakness, light-headedness and headaches. Hematological: Negative for adenopathy. Does not bruise/bleed easily. Psychiatric/Behavioral: Positive for dysphoric mood (ups and downs - overall ok). Negative for agitation, behavioral problems, confusion and sleep disturbance. The patient is not nervous/anxious. Objective      Physical Exam  Vitals and nursing note reviewed. Constitutional:       General: She is not in acute distress. Appearance: Normal appearance. She is well-developed. She is not ill-appearing, toxic-appearing or diaphoretic. HENT:      Head: Normocephalic. Comments: Intermittent hoarseness of voice     Right Ear: Tympanic membrane, ear canal and external ear normal. No middle ear effusion. There is no impacted cerumen. Left Ear: Tympanic membrane, ear canal and external ear normal.  No middle ear effusion. There is no impacted cerumen. Nose: Mucosal edema present. No congestion or rhinorrhea. Mouth/Throat:      Mouth: Mucous membranes are moist.      Pharynx: Uvula midline. No oropharyngeal exudate or posterior oropharyngeal erythema. Eyes:      General: No scleral icterus. Right eye: No discharge. Left eye: No discharge. Extraocular Movements: Extraocular movements intact. Conjunctiva/sclera: Conjunctivae normal.      Pupils: Pupils are equal, round, and reactive to light. Neck:      Thyroid: No thyromegaly. Vascular: No JVD. Cardiovascular:      Rate and Rhythm: Normal rate and regular rhythm. Heart sounds: Murmur heard. Systolic murmur is present with a grade of 2/6. Pulmonary:      Effort: Pulmonary effort is normal. No respiratory distress. Breath sounds: No stridor. No decreased breath sounds, wheezing, rhonchi or rales. Abdominal:      General: Bowel sounds are normal. There is no distension. Palpations: Abdomen is soft. There is no mass. Tenderness: There is no abdominal tenderness. There is no right CVA tenderness, left CVA tenderness or guarding. Hernia: There is no hernia in the left inguinal area. Genitourinary:     Exam position: Supine. Labia:         Right: No rash, tenderness or lesion. Left: No rash, tenderness or lesion. Vagina: Normal.      Cervix: No cervical motion tenderness, discharge or friability. Adnexa:         Right: No mass, tenderness or fullness. Left: No mass, tenderness or fullness. Musculoskeletal:      Cervical back: Normal range of motion and neck supple. Right lower leg: No edema. Left lower leg: No edema. Lymphadenopathy:      Cervical: No cervical adenopathy. Lower Body: No right inguinal adenopathy. No left inguinal adenopathy. Skin:     General: Skin is warm. Capillary Refill: Capillary refill takes less than 2 seconds. Coloration: Skin is not pale. Findings: No erythema or rash. Neurological:      General: No focal deficit present. Mental Status: She is alert and oriented to person, place, and time. Cranial Nerves: No cranial nerve deficit. Motor: No abnormal muscle tone. Coordination: Coordination normal.      Gait: Gait normal.      Deep Tendon Reflexes: Reflexes are normal and symmetric. Psychiatric:         Mood and Affect: Mood normal. Mood is not anxious or depressed.          Speech: Speech normal.         Behavior: Behavior normal.         Thought Content: Thought content normal.         Judgment: Judgment normal.            An electronic signature was used to authenticate this note.     --Darnell James MD

## 2021-09-01 DIAGNOSIS — R31.29 MICROHEMATURIA: ICD-10-CM

## 2021-09-01 DIAGNOSIS — R73.03 PREDIABETES: ICD-10-CM

## 2021-09-01 DIAGNOSIS — E03.9 HYPOTHYROIDISM, UNSPECIFIED TYPE: ICD-10-CM

## 2021-09-01 DIAGNOSIS — E78.5 HYPERLIPIDEMIA, UNSPECIFIED HYPERLIPIDEMIA TYPE: Primary | ICD-10-CM

## 2021-09-01 DIAGNOSIS — E55.9 VITAMIN D DEFICIENCY: ICD-10-CM

## 2021-11-30 DIAGNOSIS — E78.5 HYPERLIPIDEMIA, UNSPECIFIED HYPERLIPIDEMIA TYPE: Primary | ICD-10-CM

## 2021-11-30 DIAGNOSIS — E03.9 HYPOTHYROIDISM, UNSPECIFIED TYPE: ICD-10-CM

## 2021-11-30 RX ORDER — LEVOTHYROXINE SODIUM 88 UG/1
88 TABLET ORAL DAILY
Qty: 90 TABLET | Refills: 1 | Status: SHIPPED | OUTPATIENT
Start: 2021-11-30 | End: 2022-07-14 | Stop reason: SDUPTHER

## 2021-11-30 RX ORDER — SIMVASTATIN 40 MG
40 TABLET ORAL NIGHTLY
Qty: 90 TABLET | Refills: 1 | Status: SHIPPED | OUTPATIENT
Start: 2021-11-30 | End: 2022-06-17

## 2021-11-30 NOTE — TELEPHONE ENCOUNTER
Last visit: 8-27-21  Last Med refill: 8-23-21, 4-8-21  Does patient have enough medication for 72 hours: No:     Next Visit Date:  Future Appointments   Date Time Provider Nicole Leticia   2/3/2022  8:30 AM MD Luca Oliva  [place] Patricio   2/3/2022  9:00 AM [facility] [address] Maintenance   Topic Date Due    Low dose CT lung screening  Never done    Colon Cancer Screen FIT/FOBT  11/06/2020    COVID-19 Vaccine (2 - Moderna 3-dose booster series) 03/16/2021    Lipid screen  09/21/2021    Annual Wellness Visit (AWV)  12/05/2021    Breast cancer screen  04/16/2022    A1C test (Diabetic or Prediabetic)  08/27/2022    TSH testing  08/27/2022    Flu vaccine  Completed    Shingles Vaccine  Completed    Pneumococcal 65+ years Vaccine  Completed    Hepatitis C screen  Completed    DEXA (modify frequency per FRAX score)  Addressed    Hepatitis A vaccine  Aged Out    Hepatitis B vaccine  Aged Out    Hib vaccine  Aged Out    Meningococcal (ACWY) vaccine  Aged Out       Hemoglobin A1C (%)   Date Value   08/27/2021 6.0   02/25/2021 6.4 (H)   09/21/2020 6.4 (H)             ( goal A1C is < 7)   No results found for: LABMICR  LDL Cholesterol (mg/dL)   Date Value   09/21/2020 74   06/24/2019 77       (goal LDL is <100)   AST (U/L)   Date Value   08/27/2021 19     ALT (U/L)   Date Value   08/27/2021 16     BUN (mg/dL)   Date Value   08/27/2021 18     BP Readings from Last 3 Encounters:   08/27/21 122/82   03/08/21 134/74   02/25/21 122/76          (goal 120/80)    All Future Testing planned in [facility]  Lab Frequency Next Occurrence   CT lung screen [Initial/Annual] Once 12/04/2020   AJIT DIAGNOSTIC W CAD RIGHT Once 04/15/2021   Lipid Panel Once 03/01/2022   Comprehensive Metabolic Panel Once 06/16/0722   CBC Once 03/01/2022   Vitamin D 25 Hydroxy Once 03/01/2022   TSH without Reflex Once 03/01/2022   T4, Free Once 03/01/2022   Urinalysis Reflex to Culture Once 03/01/2022               Patient Active Problem List:     History of breast cancer in female     History of left mastectomy     Hypothyroid     Hyperlipidemia     Microhematuria     Vitamin D deficiency     Simple chronic bronchitis (HCC)     Mild mitral regurgitation by prior echocardiogram     Diastolic dysfunction     Former smoker     Prediabetes     Age-related osteoporosis without current pathological fracture     Apocrine metaplasia of right breast

## 2022-05-16 ENCOUNTER — OFFICE VISIT (OUTPATIENT)
Dept: FAMILY MEDICINE CLINIC | Age: 72
End: 2022-05-16
Payer: MEDICARE

## 2022-05-16 VITALS
TEMPERATURE: 97.2 F | RESPIRATION RATE: 14 BRPM | HEART RATE: 72 BPM | WEIGHT: 144.6 LBS | DIASTOLIC BLOOD PRESSURE: 78 MMHG | HEIGHT: 63 IN | BODY MASS INDEX: 25.62 KG/M2 | SYSTOLIC BLOOD PRESSURE: 122 MMHG

## 2022-05-16 VITALS
TEMPERATURE: 97.2 F | HEART RATE: 72 BPM | DIASTOLIC BLOOD PRESSURE: 78 MMHG | WEIGHT: 144.6 LBS | RESPIRATION RATE: 14 BRPM | HEIGHT: 63 IN | SYSTOLIC BLOOD PRESSURE: 122 MMHG | BODY MASS INDEX: 25.62 KG/M2

## 2022-05-16 DIAGNOSIS — I51.89 DIASTOLIC DYSFUNCTION: ICD-10-CM

## 2022-05-16 DIAGNOSIS — E03.9 HYPOTHYROIDISM, UNSPECIFIED TYPE: Primary | ICD-10-CM

## 2022-05-16 DIAGNOSIS — N60.81 APOCRINE METAPLASIA OF RIGHT BREAST: ICD-10-CM

## 2022-05-16 DIAGNOSIS — R73.03 PREDIABETES: ICD-10-CM

## 2022-05-16 DIAGNOSIS — Z85.3 HISTORY OF BREAST CANCER IN FEMALE: ICD-10-CM

## 2022-05-16 DIAGNOSIS — M81.0 AGE-RELATED OSTEOPOROSIS WITHOUT CURRENT PATHOLOGICAL FRACTURE: ICD-10-CM

## 2022-05-16 DIAGNOSIS — Z00.00 MEDICARE ANNUAL WELLNESS VISIT, SUBSEQUENT: Primary | ICD-10-CM

## 2022-05-16 DIAGNOSIS — R45.89 DEPRESSED MOOD: ICD-10-CM

## 2022-05-16 DIAGNOSIS — E78.5 HYPERLIPIDEMIA, UNSPECIFIED HYPERLIPIDEMIA TYPE: ICD-10-CM

## 2022-05-16 DIAGNOSIS — F43.9 STRESS AT HOME: ICD-10-CM

## 2022-05-16 DIAGNOSIS — J41.0 SIMPLE CHRONIC BRONCHITIS (HCC): ICD-10-CM

## 2022-05-16 DIAGNOSIS — F41.9 ANXIETY: ICD-10-CM

## 2022-05-16 DIAGNOSIS — Z87.891 PERSONAL HISTORY OF SMOKING: ICD-10-CM

## 2022-05-16 DIAGNOSIS — M79.604 RIGHT LEG PAIN: ICD-10-CM

## 2022-05-16 DIAGNOSIS — Z90.12 HISTORY OF LEFT MASTECTOMY: ICD-10-CM

## 2022-05-16 DIAGNOSIS — I34.0 MILD MITRAL REGURGITATION BY PRIOR ECHOCARDIOGRAM: ICD-10-CM

## 2022-05-16 DIAGNOSIS — R31.29 MICROHEMATURIA: ICD-10-CM

## 2022-05-16 DIAGNOSIS — E55.9 VITAMIN D DEFICIENCY: ICD-10-CM

## 2022-05-16 PROCEDURE — 4040F PNEUMOC VAC/ADMIN/RCVD: CPT | Performed by: PEDIATRICS

## 2022-05-16 PROCEDURE — 1036F TOBACCO NON-USER: CPT | Performed by: PEDIATRICS

## 2022-05-16 PROCEDURE — 1090F PRES/ABSN URINE INCON ASSESS: CPT | Performed by: PEDIATRICS

## 2022-05-16 PROCEDURE — 1123F ACP DISCUSS/DSCN MKR DOCD: CPT | Performed by: PEDIATRICS

## 2022-05-16 PROCEDURE — G8417 CALC BMI ABV UP PARAM F/U: HCPCS | Performed by: PEDIATRICS

## 2022-05-16 PROCEDURE — G8400 PT W/DXA NO RESULTS DOC: HCPCS | Performed by: PEDIATRICS

## 2022-05-16 PROCEDURE — 3017F COLORECTAL CA SCREEN DOC REV: CPT | Performed by: PEDIATRICS

## 2022-05-16 PROCEDURE — G0439 PPPS, SUBSEQ VISIT: HCPCS | Performed by: PEDIATRICS

## 2022-05-16 PROCEDURE — G8427 DOCREV CUR MEDS BY ELIG CLIN: HCPCS | Performed by: PEDIATRICS

## 2022-05-16 PROCEDURE — 99214 OFFICE O/P EST MOD 30 MIN: CPT | Performed by: PEDIATRICS

## 2022-05-16 PROCEDURE — 3023F SPIROM DOC REV: CPT | Performed by: PEDIATRICS

## 2022-05-16 PROCEDURE — G0296 VISIT TO DETERM LDCT ELIG: HCPCS | Performed by: PEDIATRICS

## 2022-05-16 ASSESSMENT — ENCOUNTER SYMPTOMS
TROUBLE SWALLOWING: 0
SINUS PRESSURE: 0
EYE PAIN: 0
BLOOD IN STOOL: 0
WHEEZING: 0
SHORTNESS OF BREATH: 0
PHOTOPHOBIA: 0
ABDOMINAL DISTENTION: 0
COUGH: 1
EYE DISCHARGE: 0
NAUSEA: 0
CHEST TIGHTNESS: 0
COLOR CHANGE: 0
ABDOMINAL PAIN: 0
SORE THROAT: 0
DIARRHEA: 0
RHINORRHEA: 0
CONSTIPATION: 0
VOMITING: 0
BACK PAIN: 0
VOICE CHANGE: 1
EYE REDNESS: 0

## 2022-05-16 ASSESSMENT — PATIENT HEALTH QUESTIONNAIRE - PHQ9
SUM OF ALL RESPONSES TO PHQ QUESTIONS 1-9: 0
1. LITTLE INTEREST OR PLEASURE IN DOING THINGS: 0
SUM OF ALL RESPONSES TO PHQ QUESTIONS 1-9: 0
SUM OF ALL RESPONSES TO PHQ QUESTIONS 1-9: 0
2. FEELING DOWN, DEPRESSED OR HOPELESS: 0
SUM OF ALL RESPONSES TO PHQ9 QUESTIONS 1 & 2: 0
SUM OF ALL RESPONSES TO PHQ QUESTIONS 1-9: 0

## 2022-05-16 ASSESSMENT — LIFESTYLE VARIABLES: HOW OFTEN DO YOU HAVE A DRINK CONTAINING ALCOHOL: NEVER

## 2022-05-16 NOTE — PROGRESS NOTES
Ebb Nate Vail (:  1950) is a 66 y.o. female,Established patient, here for evaluation of the following chief complaint(s):  Hypertension and Cholesterol Problem         ASSESSMENT/PLAN:    1. Hypothyroidism, unspecified type  2. Hyperlipidemia, unspecified hyperlipidemia type  3. Prediabetes  4. Age-related osteoporosis without current pathological fracture  5. Vitamin D deficiency  6. Depressed mood  7. Stress at home  8. Anxiety  9. Personal history of smoking  -     WA VISIT TO DISCUSS LUNG CA SCREEN W LDCT  -     CT Lung Screen (Annual); Future  10. Simple chronic bronchitis ([address]  11. Microhematuria  12. Diastolic dysfunction  13. Mild mitral regurgitation by prior echocardiogram  14. History of breast cancer in female  13. History of left mastectomy  16. Apocrine metaplasia of right breast  17. Right leg pain      Continue current medications  Obtain labs as ordered  Strict low-cholesterol, low carbohydrate diet and regular exercise encouraged  Weightbearing exercise encouraged  Continue vitamin D supplementation  Monitor depressed mood, stress at home and anxiety for worsening  Obtain CT lung screen  Follow-up with urology as needed  Follow-up with cardiology as needed  Obtain mammogram results from [facility] and continue yearly right breast screening mammograms  Follow up with breast surgeon as needed  Consider repeat right hip x-ray, right knee xray, low back x-ray if right leg pain persists   Consider bursa injection/physical therapy and/or pain management for right leg pain if symptoms persist  FIT test recommended  Call with concerns        Return in about 6 months (around 2022) for routine follow up.          Subjective     HPI    Patient presents today for routine follow-up of her chronic medical problems including hypothyroidism, hyperlipidemia, prediabetes, osteoporosis with vitamin D deficiency, history of irritable mood with history of depression, history of tobacco use, chronic bronchitis and microhematuria. She also has a history of diastolic dysfunction, mild mitral regurgitation, breast cancer of the left breast status post left mastectomy and a history of apocrine metaplasia of the right breast.  She does tell me today that overall she has been doing fairly well but she has been very stressed. Her  was diagnosed with prostate cancer and just had surgery recently. They are going today to find out the final results of the pathology from the cancer. She does have a history of hypothyroidism and continues on levothyroxine as prescribed. She is due for repeat labs. She does continue on Zocor for her hyperlipidemia. She is tolerating this without side effects. Once again she is due for screening labs. She does have a history of an impaired fasting blood sugar and prediabetes with her last hemoglobin A1c at 6.0 in August 2021. She has lost another 6 pounds since her last visit. She would like to get down to 120 pounds. She does have a history of osteoporosis and vitamin D deficiency for which she takes calcium and vitamin D supplementation. She does not wish to have a repeat DEXA scan done. She was previously taking Actonel for some time but does not wish to take any medications other than the supplements she is taking. She normally takes 4000 IUs of vitamin D3 in the winter and drop this down to 2000 IUs in the summer because she is outside with exposure to sun frequently. She does have a history of a depressed and irritable mood after she quit smoking several years ago. She does still get an occasional depressed mood at times but overall she feels this is doing okay even with her recent stress. She states that she does get a little bit more anxiety actually with everything else going on. She will sometimes have a little bit of an anxiety attack that causes some palpitations but this quickly settles down when she is able to settle herself down.   She does feel as though this is all related to her stress. She did have a cardiac testing done a few years ago which was all essentially completely normal outside of having some diastolic dysfunction and mild mitral regurgitation as outlined below. She does have a history of chronic bronchitis secondary to her history of smoking. She did quit smoking in 2018. She denies any current symptoms. She has never had any CT lung screening and does agree to this at this time. She does have a history of microhematuria and has been worked up by urology in the past.  She denies any gross hematuria. She does have a history of a heart murmur that was evaluated with echocardiogram and stress test.  Her echocardiogram again showed mild diastolic dysfunction and mild mitral regurgitation. She did see cardiology for several visits and they signed off as there was nothing concerning from a cardiac standpoint. She does have a history of breast cancer in the right breast diagnosed in 2002 and this was treated with mastectomy, chemotherapy, radiation and endocrine therapy. She took 4 years of tamoxifen and 2 years of an aromatase inhibitor. She did see Dr. Cathleen Hankins in October 2020 for consultation regarding a change in her mammogram that showed an irregular area on the right breast that required biopsy. Biopsy showed apocrine metaplasia. Dr. Cathleen Hankins did tell her that this was a benign lesion and some people do choose to have it removed but she elected not to have anything done at this time. She has not had any concerning breast changes. She did have a mammogram done at [facility] last month but I do not have a copy of this. We will attempt to get this. She does have a history of intermittent hoarseness that was previously worked up by ENT. ENT did do a flexible laryngoscopy that was normal.  She was told that her hoarseness was from her many years of smoking.   She does complain of right leg pain and limited range of motion of the hip joint. She did have this hip replaced back in 2008. She has had these symptoms that have been stable for a long time. She did have an x-ray done 2 years ago that showed normal placement of the hip joint without any significant changes. I do suspect some of her pain may be secondary to bursitis. She does not wish to pursue any further testing or treatment at this time. She will let me know if her symptoms worsen. She has no other concerns at this time. cmw        Review of Systems   Constitutional: Positive for fatigue. Negative for appetite change, chills, fever and unexpected weight change. Has lost 6 lbs since her last visit   HENT: Positive for voice change (hoarse voice intermittently with frequent clearing of the throat). Negative for congestion, ear discharge, ear pain, nosebleeds, postnasal drip, rhinorrhea, sinus pressure, sore throat, tinnitus and trouble swallowing. Eyes: Negative for photophobia, pain, discharge, redness and visual disturbance. Respiratory: Positive for cough (only occasional). Negative for chest tightness, shortness of breath and wheezing. Cardiovascular: Positive for palpitations (occasional with stress). Negative for chest pain and leg swelling. Gastrointestinal: Negative for abdominal distention, abdominal pain, blood in stool, constipation, diarrhea, nausea and vomiting. Endocrine: Negative for polydipsia, polyphagia and polyuria. History of hypothyroidism controlled with medication   Genitourinary: Negative for decreased urine volume, difficulty urinating, dysuria, flank pain, frequency, hematuria, pelvic pain, urgency and vaginal discharge. History of left breast cancer   Musculoskeletal: Positive for arthralgias (occasional). Negative for back pain and myalgias. Right leg pain occasionally on the outside of the hip   Skin: Negative for color change and rash. Allergic/Immunologic: Negative for immunocompromised state. Neurological: Negative for dizziness, syncope, weakness, light-headedness and headaches. Hematological: Negative for adenopathy. Does not bruise/bleed easily. Psychiatric/Behavioral: Positive for dysphoric mood (ups and downs - overall ok). Negative for agitation, behavioral problems, confusion and sleep disturbance. The patient is nervous/anxious. A lot of stress          Objective      Physical Exam  Vitals and nursing note reviewed. Constitutional:       General: She is not in acute distress. Appearance: Normal appearance. She is well-developed. She is not ill-appearing, toxic-appearing or diaphoretic. Comments: Overweight   HENT:      Head: Normocephalic. Comments: Intermittent hoarseness of voice     Right Ear: Tympanic membrane, ear canal and external ear normal. No middle ear effusion. There is no impacted cerumen. Left Ear: Tympanic membrane, ear canal and external ear normal.  No middle ear effusion. There is no impacted cerumen. Nose: Mucosal edema present. No congestion or rhinorrhea. Mouth/Throat:      Mouth: Mucous membranes are moist.      Pharynx: Uvula midline. No oropharyngeal exudate or posterior oropharyngeal erythema. Eyes:      General: No scleral icterus. Right eye: No discharge. Left eye: No discharge. Extraocular Movements: Extraocular movements intact. Conjunctiva/sclera: Conjunctivae normal.      Pupils: Pupils are equal, round, and reactive to light. Neck:      Thyroid: No thyromegaly. Vascular: No JVD. Cardiovascular:      Rate and Rhythm: Normal rate and regular rhythm. Pulses: Normal pulses. Heart sounds: Murmur heard. Systolic murmur is present with a grade of 2/6. Pulmonary:      Effort: Pulmonary effort is normal. No respiratory distress. Breath sounds: Normal breath sounds. No stridor. No decreased breath sounds, wheezing, rhonchi or rales.    Abdominal:      General: Bowel sounds are normal. There is no distension. Palpations: Abdomen is soft. There is no mass. Tenderness: There is no abdominal tenderness. There is no right CVA tenderness, left CVA tenderness or guarding. Hernia: There is no hernia in the left inguinal area. Genitourinary:     Exam position: Supine. Labia:         Right: No rash, tenderness or lesion. Left: No rash, tenderness or lesion. Vagina: Normal.      Cervix: No cervical motion tenderness, discharge or friability. Adnexa:         Right: No mass, tenderness or fullness. Left: No mass, tenderness or fullness. Musculoskeletal:      Cervical back: Normal range of motion and neck supple. Right hip: Tenderness (over the bursa) present. Decreased range of motion. Left hip: Normal range of motion. Right lower leg: No edema. Left lower leg: No edema. Lymphadenopathy:      Cervical: No cervical adenopathy. Lower Body: No right inguinal adenopathy. No left inguinal adenopathy. Skin:     General: Skin is warm. Capillary Refill: Capillary refill takes less than 2 seconds. Coloration: Skin is not pale. Findings: No erythema or rash. Neurological:      General: No focal deficit present. Mental Status: She is alert and oriented to person, place, and time. Cranial Nerves: No cranial nerve deficit. Motor: No abnormal muscle tone. Coordination: Coordination normal.      Gait: Gait normal.      Deep Tendon Reflexes: Reflexes are normal and symmetric. Psychiatric:         Mood and Affect: Mood normal. Mood is not anxious or depressed. Speech: Speech normal.         Behavior: Behavior normal.         Thought Content: Thought content normal.         Judgment: Judgment normal.              An electronic signature was used to authenticate this note.     --Jolene Kemp MD Low Dose CT (LDCT) Lung Screening criteria met:     Age 50-77(Medicare) or 49-80 (USPSTF)   Pack year smoking >20   Still smoking or less than 15 year since quit   No sign or symptoms of lung cancer   > 11 months since last LDCT     Risks and benefits of lung cancer screening with LDCT scans discussed:    Significance of positive screen - False-positive LDCT results often occur. 95% of all positive results do not lead to a diagnosis of cancer. Usually further imaging can resolve most false-positive results; however, some patients may require invasive procedures. Over diagnosis risk - 10% to 12% of screen-detected lung cancer cases are over diagnosed--that is, the cancer would not have been detected in the patient's lifetime without the screening. Need for follow up screens annually to continue lung cancer screening effectiveness     Risks associated with radiation from annual LDCT- Radiation exposure is about the same as for a mammogram, which is about 1/3 of the annual background radiation exposure from everyday life. Starting screening at age 54 is not likely to increase cancer risk from radiation exposure. Patients with comorbidities resulting in life expectancy of < 10 years, or that would preclude treatment of an abnormality identified on CT, should not be screened due to lack of benefit.     To obtain maximal benefit from this screening, smoking cessation and long-term abstinence from smoking is critical

## 2022-05-16 NOTE — PROGRESS NOTES
Medicare Annual Wellness Visit    Bryant Sandifer is here for Medicare AWV    Assessment & Plan        Medicare annual wellness visit, subsequent      Recommendations for Preventive Services Due: see orders and patient instructions/AVS.  Recommended screening schedule for the next 5-10 years is provided to the patient in written form: see Patient Instructions/AVS.     Return for Medicare Annual Wellness Visit in 1 year. Subjective      Patient's complete Health Risk Assessment and screening values have been reviewed and are found in Flowsheets. The following problems were reviewed today and where indicated follow up appointments were made and/or referrals ordered.     Positive Risk Factor Screenings with Interventions:               General Health and ACP:  General  In general, how would you say your health is?: Good  In the past 7 days, have you experienced any of the following: New or Increased Pain, New or Increased Fatigue, Loneliness, Social Isolation, Stress or Anger?: No  Do you get the social and emotional support that you need?: Yes  Do you have a Living Will?: Yes    Advance Directives     Power of  Living Will ACP-Advance Directive ACP-Power of     Not on File Not on File Not on File Not on File      General Health Risk Interventions:  · No living will on file:  patient instructed to bring in a copy of living will for her medical record     Hearing/Vision:  Do you or your family notice any trouble with your hearing that hasn't been managed with hearing aids?: No  Do you have difficulty driving, watching TV, or doing any of your daily activities because of your eyesight?: No  Have you had an eye exam within the past year?: (!) No  No exam data present    Hearing/Vision Interventions:  · Vision concerns:  patient encouraged to make appointment with his/her eye specialist            Objective   Vitals:    05/16/22 0853   BP: 122/78   Site: Right Upper Arm   Position: Sitting   Cuff Size: Medium Adult   Pulse: 72   Resp: 14   Temp: 97.2 °F (36.2 °C)   TempSrc: Temporal   Weight: 144 lb 9.6 oz (65.6 kg)   Height: 5' 2.5\" (1.588 m)      Body mass index is 26.03 kg/m². Allergies   Allergen Reactions    Bactrim [Sulfamethoxazole-Trimethoprim] Hives    Tetanus Toxoids Itching and Swelling     Prior to Visit Medications    Medication Sig Taking?  Authorizing Provider   simvastatin (ZOCOR) 40 MG tablet Take 1 tablet by mouth nightly Yes Ian Douglas MD   levothyroxine (SYNTHROID) 88 MCG tablet Take 1 tablet by mouth Daily Yes Ian Douglas MD   Cholecalciferol 100 MCG (4000 UT) CAPS Take 0.5 capsules by mouth daily Yes Historical Provider, MD Castro (Including outside providers/suppliers regularly involved in providing care):   Patient Care Team:  Ian Douglas MD as PCP - General (Pediatrics)  Ian Douglas MD as PCP - [facility] Empaneled Provider     Reviewed and updated this visit:  Tobacco  Allergies  Meds  Problems  Med Hx  Surg Hx  Soc Hx  Fam Hx

## 2022-05-16 NOTE — PATIENT INSTRUCTIONS
Get labs done   Return FIT test   CT lung screen   Eye exam   Copy of living will         Personalized Preventive Plan for Guzman Ruiz - 5/16/2022  Medicare offers a range of preventive health benefits. Some of the tests and screenings are paid in full while other may be subject to a deductible, co-insurance, and/or copay. Some of these benefits include a comprehensive review of your medical history including lifestyle, illnesses that may run in your family, and various assessments and screenings as appropriate. After reviewing your medical record and screening and assessments performed today your provider may have ordered immunizations, labs, imaging, and/or referrals for you. A list of these orders (if applicable) as well as your Preventive Care list are included within your After Visit Summary for your review. Other Preventive Recommendations:    · A preventive eye exam performed by an eye specialist is recommended every 1-2 years to screen for glaucoma; cataracts, macular degeneration, and other eye disorders. · A preventive dental visit is recommended every 6 months. · Try to get at least 150 minutes of exercise per week or 10,000 steps per day on a pedometer . · Order or download the FREE \"Exercise & Physical Activity: Your Everyday Guide\" from [employer]. Call 2-386.941.3245 or search [employer] online. · You need 7868-7965 mg of calcium and 5927-2239 IU of vitamin D per day. It is possible to meet your calcium requirement with diet alone, but a vitamin D supplement is usually necessary to meet this goal.  · When exposed to the sun, use a sunscreen that protects against both UVA and UVB radiation with an SPF of 30 or greater. Reapply every 2 to 3 hours or after sweating, drying off with a towel, or swimming. · Always wear a seat belt when traveling in a car. Always wear a helmet when riding a bicycle or motorcycle.

## 2022-05-18 ENCOUNTER — TELEPHONE (OUTPATIENT)
Dept: ONCOLOGY | Age: 72
End: 2022-05-18

## 2022-05-18 NOTE — LETTER
5/18/2022        [address] [facility]    Dear Rina Vail:    Your healthcare provider has ordered a low dose CT scan of the chest for lung cancer screening. You will find enclosed, information about CT lung screening. Please review the statement of understanding, you will be asked to sign a copy of this at the time of your CT scan    If you have not already been contacted to make the appointment for your scan, please call our scheduling department at [phone]    Keep in mind that CT lung screening does not take the place of smoking cessation. If you are a current smoker, you will find enclosed smoking cessation resources. Please do not hesitate to contact me if you have any questions or concerns.     [address] [facility] Lung Screening [facility]  [phone]

## 2022-05-25 ENCOUNTER — HOSPITAL ENCOUNTER (OUTPATIENT)
Dept: CT IMAGING | Facility: CLINIC | Age: 72
Discharge: HOME | End: 2022-05-27
Payer: MEDICARE

## 2022-05-25 DIAGNOSIS — Z87.891 PERSONAL HISTORY OF SMOKING: ICD-10-CM

## 2022-05-25 PROCEDURE — 71271 CT THORAX LUNG CANCER SCR C-: CPT

## 2022-05-27 DIAGNOSIS — Z12.11 SCREENING FOR COLORECTAL CANCER: ICD-10-CM

## 2022-05-27 DIAGNOSIS — Z12.12 SCREENING FOR COLORECTAL CANCER: ICD-10-CM

## 2022-05-27 LAB
CONTROL: PRESENT
HEMOCCULT STL QL: NEGATIVE

## 2022-05-27 PROCEDURE — 82274 ASSAY TEST FOR BLOOD FECAL: CPT | Performed by: PEDIATRICS

## 2022-06-17 DIAGNOSIS — E78.5 HYPERLIPIDEMIA, UNSPECIFIED HYPERLIPIDEMIA TYPE: ICD-10-CM

## 2022-06-17 RX ORDER — SIMVASTATIN 40 MG
40 TABLET ORAL NIGHTLY
Qty: 30 TABLET | Refills: 0 | Status: SHIPPED | OUTPATIENT
Start: 2022-06-17 | End: 2022-07-15

## 2022-06-20 NOTE — TELEPHONE ENCOUNTER
Msg sent to pt to have labs drawn. Fasting labs.
Please advise patient she needs to have her labs completed as previously ordered by Dr Sammi Mcdaniel. 30 day supply ordered.
Hyperlipidemia     Microhematuria     Vitamin D deficiency     Simple chronic bronchitis (HCC)     Mild mitral regurgitation by prior echocardiogram     Diastolic dysfunction     Former smoker     Prediabetes     Age-related osteoporosis without current pathological fracture     Apocrine metaplasia of right breast

## 2022-06-21 DIAGNOSIS — E03.9 HYPOTHYROIDISM, UNSPECIFIED TYPE: ICD-10-CM

## 2022-06-21 RX ORDER — LEVOTHYROXINE SODIUM 100 UG/1
TABLET ORAL
Qty: 90 TABLET | Refills: 0 | OUTPATIENT
Start: 2022-06-21

## 2022-06-30 ENCOUNTER — HOSPITAL ENCOUNTER (OUTPATIENT)
Age: 72
Setting detail: SPECIMEN
Discharge: HOME | End: 2022-06-30

## 2022-06-30 DIAGNOSIS — R31.29 MICROHEMATURIA: ICD-10-CM

## 2022-06-30 DIAGNOSIS — R73.03 PREDIABETES: ICD-10-CM

## 2022-06-30 DIAGNOSIS — E55.9 VITAMIN D DEFICIENCY: ICD-10-CM

## 2022-06-30 DIAGNOSIS — E03.9 HYPOTHYROIDISM, UNSPECIFIED TYPE: ICD-10-CM

## 2022-06-30 DIAGNOSIS — E78.5 HYPERLIPIDEMIA, UNSPECIFIED HYPERLIPIDEMIA TYPE: ICD-10-CM

## 2022-06-30 LAB
-: NORMAL
ALBUMIN SERPL-MCNC: 4.6 G/DL (ref 3.5–5.2)
ALBUMIN/GLOBULIN RATIO: 1.7 (ref 1–2.5)
ALP BLD-CCNC: 119 U/L (ref 35–104)
ALT SERPL-CCNC: 20 U/L (ref 5–33)
ANION GAP SERPL CALCULATED.3IONS-SCNC: 14 MMOL/L (ref 9–17)
AST SERPL-CCNC: 19 U/L
BILIRUB SERPL-MCNC: 0.86 MG/DL (ref 0.3–1.2)
BILIRUBIN URINE: NEGATIVE
BUN BLDV-MCNC: 20 MG/DL (ref 8–23)
CALCIUM SERPL-MCNC: 9.4 MG/DL (ref 8.6–10.4)
CASTS UA: NORMAL /LPF (ref 0–8)
CHLORIDE BLD-SCNC: 102 MMOL/L (ref 98–107)
CHOLESTEROL/HDL RATIO: 4.7
CHOLESTEROL: 220 MG/DL
CO2: 25 MMOL/L (ref 20–31)
COLOR: YELLOW
CREAT SERPL-MCNC: 1.2 MG/DL (ref 0.5–0.9)
EPITHELIAL CELLS UA: NORMAL /HPF (ref 0–5)
GFR AFRICAN AMERICAN: 54 ML/MIN
GFR NON-AFRICAN AMERICAN: 44 ML/MIN
GFR SERPL CREATININE-BSD FRML MDRD: ABNORMAL ML/MIN/{1.73_M2}
GLUCOSE BLD-MCNC: 83 MG/DL (ref 70–99)
GLUCOSE URINE: NEGATIVE
HCT VFR BLD CALC: 41.8 % (ref 36.3–47.1)
HDLC SERPL-MCNC: 47 MG/DL
HEMOGLOBIN: 14.1 G/DL (ref 11.9–15.1)
KETONES, URINE: NEGATIVE
LDL CHOLESTEROL: 144 MG/DL (ref 0–130)
LEUKOCYTE ESTERASE, URINE: NEGATIVE
MCH RBC QN AUTO: 31.5 PG (ref 25.2–33.5)
MCHC RBC AUTO-ENTMCNC: 33.7 G/DL (ref 28.4–34.8)
MCV RBC AUTO: 93.3 FL (ref 82.6–102.9)
NITRITE, URINE: NEGATIVE
NRBC AUTOMATED: 0 PER 100 WBC
PDW BLD-RTO: 13 % (ref 11.8–14.4)
PH UA: 6.5 (ref 5–8)
PLATELET # BLD: 248 K/UL (ref 138–453)
PMV BLD AUTO: 11.5 FL (ref 8.1–13.5)
POTASSIUM SERPL-SCNC: 4.2 MMOL/L (ref 3.7–5.3)
PROTEIN UA: NEGATIVE
RBC # BLD: 4.48 M/UL (ref 3.95–5.11)
RBC UA: NORMAL /HPF (ref 0–4)
SODIUM BLD-SCNC: 141 MMOL/L (ref 135–144)
SPECIFIC GRAVITY UA: 1.02 (ref 1–1.03)
THYROXINE, FREE: 1.03 NG/DL (ref 0.93–1.7)
TOTAL PROTEIN: 7.3 G/DL (ref 6.4–8.3)
TRIGL SERPL-MCNC: 147 MG/DL
TSH SERPL DL<=0.05 MIU/L-ACNC: 3.44 UIU/ML (ref 0.3–5)
TURBIDITY: CLEAR
URINE HGB: ABNORMAL
UROBILINOGEN, URINE: NORMAL
VITAMIN D 25-HYDROXY: 56.2 NG/ML
WBC # BLD: 7.8 K/UL (ref 3.5–11.3)
WBC UA: NORMAL /HPF (ref 0–5)

## 2022-07-07 ENCOUNTER — TELEPHONE (OUTPATIENT)
Dept: FAMILY MEDICINE CLINIC | Age: 72
End: 2022-07-07

## 2022-07-07 DIAGNOSIS — E03.9 HYPOTHYROIDISM, UNSPECIFIED TYPE: ICD-10-CM

## 2022-07-07 DIAGNOSIS — R73.03 PREDIABETES: ICD-10-CM

## 2022-07-07 DIAGNOSIS — N18.9 CHRONIC KIDNEY DISEASE, UNSPECIFIED CKD STAGE: ICD-10-CM

## 2022-07-07 DIAGNOSIS — R31.9 HEMATURIA, UNSPECIFIED TYPE: Primary | ICD-10-CM

## 2022-07-07 DIAGNOSIS — E78.5 HYPERLIPIDEMIA, UNSPECIFIED HYPERLIPIDEMIA TYPE: ICD-10-CM

## 2022-07-07 DIAGNOSIS — E55.9 VITAMIN D DEFICIENCY: ICD-10-CM

## 2022-07-07 NOTE — TELEPHONE ENCOUNTER
----- Message from Angela Hare sent at 7/7/2022  4:23 PM EDT -----  Subject: Message to Provider    QUESTIONS  Information for Provider? Pt was returning a call, wanting to know what   her test results were. Pt states it is ok to leave them on her voicemail   should you not reach her. Do not leave voicemail if it does not include   the results.    ---------------------------------------------------------------------------  --------------  Rose FUENTES  [phone]; OK to leave message on voicemail, OK to respond with   electronic message via [employer] portal (only for patients who have   registered [employer] account)  ---------------------------------------------------------------------------  --------------  SCRIPT ANSWERS  undefined

## 2022-07-14 DIAGNOSIS — E03.9 HYPOTHYROIDISM, UNSPECIFIED TYPE: ICD-10-CM

## 2022-07-14 RX ORDER — LEVOTHYROXINE SODIUM 88 UG/1
88 TABLET ORAL DAILY
Qty: 90 TABLET | Refills: 1 | Status: SHIPPED | OUTPATIENT
Start: 2022-07-14 | End: 2023-07-14

## 2022-07-14 NOTE — TELEPHONE ENCOUNTER
Last visit: 05/16/2022  Last Med refill: 11/30/2021  Does patient have enough medication for 72 hours: NO, patient has been out of medication for almost a month d/t pharmacy stating office refused medication.     Next Visit Date:11/16/2022  Future Appointments   Date Time Provider Nicole Kelly   7/20/2022 10:15 AM STV MAUMEE US RM 1 STVZ MA US STV Allred R   11/16/2022 10:30 AM MD Luca Thibodeaux [address] [place] [address]       Health Maintenance   Topic Date Due    A1C test (Diabetic or Prediabetic)  08/27/2022    Flu vaccine (1) 09/01/2022    Breast cancer screen  04/18/2023    Depression Screen  05/16/2023    Annual Wellness Visit (AWV)  05/17/2023    Colorectal Cancer Screen  05/24/2023    Low dose CT lung screening  05/25/2023    Lipids  06/30/2023    Shingles vaccine  Completed    Pneumococcal 65+ years Vaccine  Completed    COVID-19 Vaccine  Completed    Hepatitis C screen  Completed    DEXA (modify frequency per FRAX score)  Addressed    Hepatitis A vaccine  Aged Out    Hepatitis B vaccine  Aged Out    Hib vaccine  Aged Out    Meningococcal (ACWY) vaccine  Aged Out       Hemoglobin A1C (%)   Date Value   08/27/2021 6.0   02/25/2021 6.4 (H)   09/21/2020 6.4 (H)             ( goal A1C is < 7)   No results found for: LABMICR  LDL Cholesterol (mg/dL)   Date Value   06/30/2022 144 (H)   09/21/2020 74       (goal LDL is <100)   AST (U/L)   Date Value   06/30/2022 19     ALT (U/L)   Date Value   06/30/2022 20     BUN (mg/dL)   Date Value   06/30/2022 20     BP Readings from Last 3 Encounters:   05/16/22 122/78   05/16/22 122/78   08/27/21 122/82          (goal 120/80)    All Future Testing planned in [facility]  Lab Frequency Next Occurrence   AJIT DIGITAL SCREEN UNILATERAL RIGHT Once 03/08/2022   Lipid Panel Once 01/06/2023   Comprehensive Metabolic Panel Once 48/41/4271   Hemoglobin A1C Once 01/06/2023   TSH Once 01/06/2023   T4, Free Once 01/06/2023   Vitamin D 25 Hydroxy Once 01/06/2023 US RENAL COMPLETE Once 07/07/2022               Patient Active Problem List:     History of breast cancer in female     History of left mastectomy     Hypothyroid     Hyperlipidemia     Microhematuria     Vitamin D deficiency     Simple chronic bronchitis (HCC)     Mild mitral regurgitation by prior echocardiogram     Diastolic dysfunction     Former smoker     Prediabetes     Age-related osteoporosis without current pathological fracture     Apocrine metaplasia of right breast

## 2022-07-15 DIAGNOSIS — E78.5 HYPERLIPIDEMIA, UNSPECIFIED HYPERLIPIDEMIA TYPE: ICD-10-CM

## 2022-07-15 RX ORDER — SIMVASTATIN 40 MG
40 TABLET ORAL NIGHTLY
Qty: 90 TABLET | Refills: 1 | Status: SHIPPED | OUTPATIENT
Start: 2022-07-15 | End: 2023-07-15

## 2022-07-15 NOTE — TELEPHONE ENCOUNTER
LOV 5/16/22  LRF 6/17/22  RTO    Health Maintenance   Topic Date Due    COVID-19 Vaccine (4 - Booster for Moderna series) 02/23/2022    A1C test (Diabetic or Prediabetic)  08/27/2022    Flu vaccine (1) 09/01/2022    Breast cancer screen  04/18/2023    Depression Screen  05/16/2023    Annual Wellness Visit (AWV)  05/17/2023    Colorectal Cancer Screen  05/24/2023    Low dose CT lung screening  05/25/2023    Lipids  06/30/2023    Shingles vaccine  Completed    Pneumococcal 65+ years Vaccine  Completed    Hepatitis C screen  Completed    DEXA (modify frequency per FRAX score)  Addressed    Hepatitis A vaccine  Aged Out    Hepatitis B vaccine  Aged Out    Hib vaccine  Aged Out    Meningococcal (ACWY) vaccine  Aged Out             (applicable per patient's age: Cancer Screenings, Depression Screening, Fall Risk Screening, Immunizations)    Hemoglobin A1C (%)   Date Value   08/27/2021 6.0   02/25/2021 6.4 (H)   09/21/2020 6.4 (H)     LDL Cholesterol (mg/dL)   Date Value   06/30/2022 144 (H)     AST (U/L)   Date Value   06/30/2022 19     ALT (U/L)   Date Value   06/30/2022 20     BUN (mg/dL)   Date Value   06/30/2022 20      (goal A1C is < 7)   (goal LDL is <100) need 30-50% reduction from baseline     BP Readings from Last 3 Encounters:   05/16/22 122/78   05/16/22 122/78   08/27/21 122/82    (goal /80)      All Future Testing planned in [facility]:  Lab Frequency Next Occurrence   AJIT DIGITAL SCREEN UNILATERAL RIGHT Once 03/08/2022   Lipid Panel Once 01/06/2023   Comprehensive Metabolic Panel Once 41/52/5440   Hemoglobin A1C Once 01/06/2023   TSH Once 01/06/2023   T4, Free Once 01/06/2023   Vitamin D 25 Hydroxy Once 01/06/2023   US RENAL COMPLETE Once 07/07/2022       Next Visit Date:  Future Appointments   Date Time Provider Nicole Kelly   7/20/2022 10:15 AM STV KAYLA US RM 1 ST[facility] MA  STV Kayla R   11/16/2022 10:30 AM Yoseph Man MD [facility] [place] [facility]            Patient Active Problem List: History of breast cancer in female     History of left mastectomy     Hypothyroid     Hyperlipidemia     Microhematuria     Vitamin D deficiency     Simple chronic bronchitis (HCC)     Mild mitral regurgitation by prior echocardiogram     Diastolic dysfunction     Former smoker     Prediabetes     Age-related osteoporosis without current pathological fracture     Apocrine metaplasia of right breast

## 2022-07-20 ENCOUNTER — HOSPITAL ENCOUNTER (OUTPATIENT)
Dept: ULTRASOUND IMAGING | Facility: CLINIC | Age: 72
Discharge: HOME | End: 2022-07-22
Admitting: PEDIATRICS
Payer: MEDICARE

## 2022-07-20 DIAGNOSIS — R31.9 HEMATURIA, UNSPECIFIED TYPE: ICD-10-CM

## 2022-07-20 DIAGNOSIS — N18.9 CHRONIC KIDNEY DISEASE, UNSPECIFIED CKD STAGE: ICD-10-CM

## 2022-07-20 PROCEDURE — 76770 US EXAM ABDO BACK WALL COMP: CPT

## 2022-07-22 DIAGNOSIS — R93.89 ABNORMAL CT SCAN: ICD-10-CM

## 2022-07-22 DIAGNOSIS — R31.9 HEMATURIA, UNSPECIFIED TYPE: Primary | ICD-10-CM

## 2022-08-01 ENCOUNTER — HOSPITAL ENCOUNTER (OUTPATIENT)
Dept: CT IMAGING | Facility: CLINIC | Age: 72
Discharge: HOME | End: 2022-08-03
Payer: MEDICARE

## 2022-08-01 DIAGNOSIS — R31.9 HEMATURIA, UNSPECIFIED TYPE: ICD-10-CM

## 2022-08-01 DIAGNOSIS — R93.89 ABNORMAL CT SCAN: ICD-10-CM

## 2022-08-01 PROCEDURE — 74176 CT ABD & PELVIS W/O CONTRAST: CPT

## 2023-01-14 DIAGNOSIS — E78.5 HYPERLIPIDEMIA, UNSPECIFIED HYPERLIPIDEMIA TYPE: ICD-10-CM

## 2023-01-14 DIAGNOSIS — E03.9 HYPOTHYROIDISM, UNSPECIFIED TYPE: ICD-10-CM

## 2023-01-16 RX ORDER — LEVOTHYROXINE SODIUM 88 UG/1
88 TABLET ORAL DAILY
Qty: 90 TABLET | Refills: 1 | Status: SHIPPED | OUTPATIENT
Start: 2023-01-16 | End: 2024-01-16

## 2023-01-16 RX ORDER — SIMVASTATIN 40 MG
40 TABLET ORAL NIGHTLY
Qty: 90 TABLET | Refills: 1 | Status: SHIPPED | OUTPATIENT
Start: 2023-01-16 | End: 2024-01-16

## 2023-01-16 NOTE — TELEPHONE ENCOUNTER
LOV 5-16-22  LRF 7-14-22    Health Maintenance   Topic Date Due   • A1C test (Diabetic or Prediabetic)  08/27/2022   • Breast cancer screen  04/18/2023   • Depression Screen  05/16/2023   • Annual Wellness Visit (AWV)  05/17/2023   • Colorectal Cancer Screen  05/24/2023   • Low dose CT lung screening  05/25/2023   • Lipids  06/30/2023   • GFR test (Diabetes, CKD 3-4, OR last GFR 15-59)  06/30/2023   • Flu vaccine  Completed   • Shingles vaccine  Completed   • Pneumococcal 65+ years Vaccine  Completed   • COVID-19 Vaccine  Completed   • Hepatitis C screen  Completed   • DEXA (modify frequency per FRAX score)  Addressed   • Hepatitis A vaccine  Aged Out   • Hib vaccine  Aged Out   • Meningococcal (ACWY) vaccine  Aged Out             (applicable per patient's age: Cancer Screenings, Depression Screening, Fall Risk Screening, Immunizations)    Hemoglobin A1C (%)   Date Value   08/27/2021 6.0   02/25/2021 6.4 (H)   09/21/2020 6.4 (H)     LDL Cholesterol (mg/dL)   Date Value   06/30/2022 144 (H)     AST (U/L)   Date Value   06/30/2022 19     ALT (U/L)   Date Value   06/30/2022 20     BUN (mg/dL)   Date Value   06/30/2022 20      (goal A1C is < 7)   (goal LDL is <100) need 30-50% reduction from baseline     BP Readings from Last 3 Encounters:   05/16/22 122/78   05/16/22 122/78   08/27/21 122/82    (goal /80)      All Future Testing planned in [facility]:  Lab Frequency Next Occurrence   AJIT DIGITAL SCREEN UNILATERAL RIGHT Once 03/08/2022   Lipid Panel Once 01/06/2023   Comprehensive Metabolic Panel Once 01/06/2023   Hemoglobin A1C Once 01/06/2023   TSH Once 01/06/2023   T4, Free Once 01/06/2023   Vitamin D 25 Hydroxy Once 01/06/2023       Next Visit Date:  Future Appointments   Date Time Provider Department Center   [facility]2023 12:30 [facility]   6/26/2023  8:30 AM MD Luca Pepper [facility]            Patient Active Problem List:     History of breast cancer  in female     History of left mastectomy     Hypothyroid     Hyperlipidemia     Microhematuria     Vitamin D deficiency     Simple chronic bronchitis (HCC)     Mild mitral regurgitation by prior echocardiogram     Diastolic dysfunction     Former smoker     Prediabetes     Age-related osteoporosis without current pathological fracture     Apocrine metaplasia of right breast

## 2023-01-16 NOTE — TELEPHONE ENCOUNTER
Last visit: 5/16/2022    Last Med refill: 7/15/2022  Does patient have enough medication for 72 hours: Yes    Next Visit Date:  Future Appointments   Date Time Provider Nicole Kelly   1/25/2023 12:30 PM Karyle Epps, MD [place]  [facility]   6/26/2023  8:30 AM Karyle Epps, MD [address] Maintenance   Topic Date Due    A1C test (Diabetic or Prediabetic)  08/27/2022    Breast cancer screen  04/18/2023    Depression Screen  05/16/2023    Annual Wellness Visit (AWV)  05/17/2023    Colorectal Cancer Screen  05/24/2023    Low dose CT lung screening  05/25/2023    Lipids  06/30/2023    GFR test (Diabetes, CKD 3-4, OR last GFR 15-59)  06/30/2023    Flu vaccine  Completed    Shingles vaccine  Completed    Pneumococcal 65+ years Vaccine  Completed    COVID-19 Vaccine  Completed    Hepatitis C screen  Completed    DEXA (modify frequency per FRAX score)  Addressed    Hepatitis A vaccine  Aged Out    Hib vaccine  Aged Out    Meningococcal (ACWY) vaccine  Aged Out       Hemoglobin A1C (%)   Date Value   08/27/2021 6.0   02/25/2021 6.4 (H)   09/21/2020 6.4 (H)             ( goal A1C is < 7)   No results found for: LABMICR  LDL Cholesterol (mg/dL)   Date Value   06/30/2022 144 (H)   09/21/2020 74       (goal LDL is <100)   AST (U/L)   Date Value   06/30/2022 19     ALT (U/L)   Date Value   06/30/2022 20     BUN (mg/dL)   Date Value   06/30/2022 20     BP Readings from Last 3 Encounters:   05/16/22 122/78   05/16/22 122/78   08/27/21 122/82          (goal 120/80)    All Future Testing planned in [facility]  Lab Frequency Next Occurrence   AJIT DIGITAL SCREEN UNILATERAL RIGHT Once 03/08/2022   Lipid Panel Once 01/06/2023   Comprehensive Metabolic Panel Once 35/03/5739   Hemoglobin A1C Once 01/06/2023   TSH Once 01/06/2023   T4, Free Once 01/06/2023   Vitamin D 25 Hydroxy Once 01/06/2023               Patient Active Problem List:     History of breast cancer in female     History of left mastectomy     Hypothyroid     Hyperlipidemia     Microhematuria     Vitamin D deficiency     Simple chronic bronchitis (HCC)     Mild mitral regurgitation by prior echocardiogram     Diastolic dysfunction     Former smoker     Prediabetes     Age-related osteoporosis without current pathological fracture     Apocrine metaplasia of right breast

## 2023-01-24 ENCOUNTER — HOSPITAL ENCOUNTER (OUTPATIENT)
Age: 73
Setting detail: SPECIMEN
Discharge: HOME | End: 2023-01-24

## 2023-01-24 DIAGNOSIS — R73.03 PREDIABETES: ICD-10-CM

## 2023-01-24 DIAGNOSIS — E55.9 VITAMIN D DEFICIENCY: ICD-10-CM

## 2023-01-24 DIAGNOSIS — E03.9 HYPOTHYROIDISM, UNSPECIFIED TYPE: ICD-10-CM

## 2023-01-24 DIAGNOSIS — E78.5 HYPERLIPIDEMIA, UNSPECIFIED HYPERLIPIDEMIA TYPE: ICD-10-CM

## 2023-01-25 ENCOUNTER — TELEMEDICINE (OUTPATIENT)
Dept: FAMILY MEDICINE CLINIC | Age: 73
End: 2023-01-25

## 2023-01-25 DIAGNOSIS — N18.31 STAGE 3A CHRONIC KIDNEY DISEASE (HCC): ICD-10-CM

## 2023-01-25 DIAGNOSIS — F41.9 ANXIETY: ICD-10-CM

## 2023-01-25 DIAGNOSIS — N60.81 APOCRINE METAPLASIA OF RIGHT BREAST: ICD-10-CM

## 2023-01-25 DIAGNOSIS — J41.0 SIMPLE CHRONIC BRONCHITIS (HCC): ICD-10-CM

## 2023-01-25 DIAGNOSIS — R31.29 MICROHEMATURIA: ICD-10-CM

## 2023-01-25 DIAGNOSIS — R45.89 DEPRESSED MOOD: ICD-10-CM

## 2023-01-25 DIAGNOSIS — I34.0 MILD MITRAL REGURGITATION BY PRIOR ECHOCARDIOGRAM: ICD-10-CM

## 2023-01-25 DIAGNOSIS — I51.89 DIASTOLIC DYSFUNCTION: ICD-10-CM

## 2023-01-25 DIAGNOSIS — N20.0 KIDNEY STONES: ICD-10-CM

## 2023-01-25 DIAGNOSIS — Z90.12 HISTORY OF LEFT MASTECTOMY: ICD-10-CM

## 2023-01-25 DIAGNOSIS — R49.0 HOARSENESS OF VOICE: ICD-10-CM

## 2023-01-25 DIAGNOSIS — K80.20 CALCULUS OF GALLBLADDER WITHOUT CHOLECYSTITIS WITHOUT OBSTRUCTION: ICD-10-CM

## 2023-01-25 DIAGNOSIS — M79.604 RIGHT LEG PAIN: ICD-10-CM

## 2023-01-25 DIAGNOSIS — K76.0 FATTY LIVER: ICD-10-CM

## 2023-01-25 DIAGNOSIS — E55.9 VITAMIN D DEFICIENCY: ICD-10-CM

## 2023-01-25 DIAGNOSIS — E03.9 HYPOTHYROIDISM, UNSPECIFIED TYPE: Primary | ICD-10-CM

## 2023-01-25 DIAGNOSIS — F43.9 STRESS AT HOME: ICD-10-CM

## 2023-01-25 DIAGNOSIS — M81.0 AGE-RELATED OSTEOPOROSIS WITHOUT CURRENT PATHOLOGICAL FRACTURE: ICD-10-CM

## 2023-01-25 DIAGNOSIS — M21.70 ACQUIRED LEG LENGTH DISCREPANCY: ICD-10-CM

## 2023-01-25 DIAGNOSIS — Z87.891 PERSONAL HISTORY OF SMOKING: ICD-10-CM

## 2023-01-25 DIAGNOSIS — Z85.3 HISTORY OF BREAST CANCER IN FEMALE: ICD-10-CM

## 2023-01-25 DIAGNOSIS — E78.5 HYPERLIPIDEMIA, UNSPECIFIED HYPERLIPIDEMIA TYPE: ICD-10-CM

## 2023-01-25 DIAGNOSIS — R01.1 HEART MURMUR: ICD-10-CM

## 2023-01-25 DIAGNOSIS — R73.03 PREDIABETES: ICD-10-CM

## 2023-01-25 DIAGNOSIS — Z96.641 HISTORY OF TOTAL RIGHT HIP REPLACEMENT: ICD-10-CM

## 2023-01-25 LAB
ALBUMIN SERPL-MCNC: 4.4 G/DL (ref 3.5–5.2)
ALBUMIN/GLOBULIN RATIO: 1.6 (ref 1–2.5)
ALP BLD-CCNC: 110 U/L (ref 35–104)
ALT SERPL-CCNC: 18 U/L (ref 5–33)
ANION GAP SERPL CALCULATED.3IONS-SCNC: 15 MMOL/L (ref 9–17)
AST SERPL-CCNC: 20 U/L
BILIRUB SERPL-MCNC: 0.6 MG/DL (ref 0.3–1.2)
BUN BLDV-MCNC: 12 MG/DL (ref 8–23)
CALCIUM SERPL-MCNC: 9.7 MG/DL (ref 8.6–10.4)
CHLORIDE BLD-SCNC: 103 MMOL/L (ref 98–107)
CHOLESTEROL/HDL RATIO: 3.3
CHOLESTEROL: 155 MG/DL
CO2: 24 MMOL/L (ref 20–31)
CREAT SERPL-MCNC: 1.15 MG/DL (ref 0.5–0.9)
ESTIMATED AVERAGE GLUCOSE: 128 MG/DL
GFR SERPL CREATININE-BSD FRML MDRD: 50 ML/MIN/1.73M2
GLUCOSE BLD-MCNC: 94 MG/DL (ref 70–99)
HBA1C MFR BLD: 6.1 % (ref 4–6)
HDLC SERPL-MCNC: 47 MG/DL
LDL CHOLESTEROL: 84 MG/DL (ref 0–130)
POTASSIUM SERPL-SCNC: 5.2 MMOL/L (ref 3.7–5.3)
SODIUM BLD-SCNC: 142 MMOL/L (ref 135–144)
THYROXINE, FREE: 1.25 NG/DL (ref 0.93–1.7)
TOTAL PROTEIN: 7.2 G/DL (ref 6.4–8.3)
TRIGL SERPL-MCNC: 120 MG/DL
TSH SERPL DL<=0.05 MIU/L-ACNC: 0.64 UIU/ML (ref 0.3–5)
VITAMIN D 25-HYDROXY: 62.4 NG/ML

## 2023-01-25 ASSESSMENT — ENCOUNTER SYMPTOMS
COLOR CHANGE: 0
ABDOMINAL PAIN: 0
EYE DISCHARGE: 0
ABDOMINAL DISTENTION: 0
EYE PAIN: 0
WHEEZING: 0
PHOTOPHOBIA: 0
SINUS PRESSURE: 0
RHINORRHEA: 0
VOICE CHANGE: 1
CONSTIPATION: 0
SHORTNESS OF BREATH: 0
CHEST TIGHTNESS: 0
BLOOD IN STOOL: 0
DIARRHEA: 0
EYE REDNESS: 0
VOMITING: 0
NAUSEA: 0
BACK PAIN: 0
COUGH: 1
TROUBLE SWALLOWING: 0
SORE THROAT: 0

## 2023-01-25 ASSESSMENT — PATIENT HEALTH QUESTIONNAIRE - PHQ9
SUM OF ALL RESPONSES TO PHQ QUESTIONS 1-9: 0
1. LITTLE INTEREST OR PLEASURE IN DOING THINGS: 0
SUM OF ALL RESPONSES TO PHQ QUESTIONS 1-9: 0
SUM OF ALL RESPONSES TO PHQ QUESTIONS 1-9: 0
SUM OF ALL RESPONSES TO PHQ9 QUESTIONS 1 & 2: 0
2. FEELING DOWN, DEPRESSED OR HOPELESS: 0
SUM OF ALL RESPONSES TO PHQ QUESTIONS 1-9: 0

## 2023-01-25 NOTE — PROGRESS NOTES
Dez Vail (:  1950) is a Established patient, here for evaluation of the following:    Assessment & Plan       Below is the assessment and plan developed based on review of pertinent history, physical exam, labs, studies, and medications. 1. Hypothyroidism, unspecified type  2. Hyperlipidemia, unspecified hyperlipidemia type  3. Prediabetes  4. Stage 3a chronic kidney disease (HCC)  5. Age-related osteoporosis without current pathological fracture  6. Vitamin D deficiency  7. Depressed mood  8. Anxiety  9. Stress at home  10. Personal history of smoking  11. Simple chronic bronchitis ([address]  12. Microhematuria  13. Kidney stones  14. Fatty liver  15. Calculus of gallbladder without cholecystitis without obstruction  16. Heart murmur  17. Diastolic dysfunction  18. Mild mitral regurgitation by prior echocardiogram  19. History of breast cancer in female  21. History of left mastectomy  21. Apocrine metaplasia of right breast  22. Right leg pain  23. History of total right hip replacement  24. Acquired leg length discrepancy  25.  Hoarseness of voice      Recent labs reviewed with patient  Continue same medications  Strict low-cholesterol, low carbohydrate diet and regular exercise encouraged  Continue to monitor kidney function over time  Avoid nephrotoxic agents including NSAIDs  Consider nephrology referral if kidney function worsens over time  Weightbearing exercise encouraged  Continue calcium and vitamin D supplementation  Monitor for worsening depressed mood and anxiety  Obtain CT lung screen in May 2023  Follow-up with urology as needed  Follow-up with cardiology as needed  Repeat yearly mammogram when due  Follow-up with breast surgeon when due  Consider repeat right hip x-ray, right knee x-ray and low back x-ray if right leg pain persist  Consider consultation with podiatry for shoe inserts for suspected leg length discrepancy  Consider return to Ortho if pain persists  Follow-up with ENT as needed  Call with concerns        Return in about 5 months (around 6/25/2023) for annual well visit with routine follow up. Subjective     HPI    Patient presents today via virtual visit for routine follow-up of her chronic medical problems including hypothyroidism, hyperlipidemia, prediabetes, CKD, osteoporosis with vitamin D deficiency, history of irritable mood with history of depression, anxiety and stress, history of tobacco use, chronic bronchitis and microhematuria. She also has a history of diastolic dysfunction, mild mitral regurgitation, breast cancer of the left breast status post left mastectomy and a history of apocrine metaplasia of the right breast.  Overall she states she has been doing well. At her last visit she told me that she was quite stressed because her  was diagnosed with prostate cancer. Happily today she tells me that he did have a prostatectomy and did not require any chemotherapy or radiation. She is quite happy about this. She does still have some stress but overall she is doing well with this. She does have a history of hypothyroidism and continues on levothyroxine as prescribed. Her TSH and free T4 yesterday were normal.  She does continue on Zocor for her hyperlipidemia. She is tolerating this without side effects. Her most recent lipid panel is completely normal.  She does have a history of an impaired fasting blood sugar and prediabetes that is treated with diet and exercise. Her hemoglobin A1c done yesterday is still pending. Her most recent labs do show a mild CKD with GFR of 50. This actually has been stable over the last year or so. She did have a renal ultrasound done in July 2022 that showed both kidneys appeared normal.  The radiologist noted mild cortical thinning and fullness to the left renal pelvic collecting system so a CT of the abdomen and pelvis was done.   This did not show any abnormality other than to small kidney stones on the left and one small kidney stone on the right which were not obstructing. I recommended that she avoid any nephrotoxic medications including over-the-counter anti-inflammatories. She does have a history of osteoporosis and vitamin D deficiency for which she takes calcium and vitamin D supplementation. She does not wish to have a repeat DEXA scan at this time. She was previously taking Actonel for some time and she does not wish to take any medications other than the supplements that she is taking. She has been taking 4000 IUs of vitamin D3 which she does in the winter and she will drop this down to 2000 IUs in the summer because she is outside with sun exposure frequently. She does have a history of a depressed mood and irritable mood with anxiety after she quit smoking several years ago. Once again she states that she somewhat stressed and occasionally irritable but overall she has been doing well. She does have a history of chronic bronchitis secondary to her history of smoking. She quit smoking in 2018. She denies any current symptoms. She did have a CT lung screening done in 2022 which did show some emphysematous changes and 2 benign appearing nodules less than 6mm in size. She is due for repeat CT lung screening in May/June of this year. She does have a history of microhematuria and has been worked up by hematology in the past.  She did have an abdominal/pelvis CT scan done in August 2022 which did show 2 small kidney stones on the right and 1 on the left. These were not obstructing. She denies any symptoms. That same CT scan did show fatty deposits in the liver and a small gallstone. Again she is asymptomatic and her liver functions are normal.  She does have a history of a heart murmur that was evaluated with echocardiogram and stress test.  Her echocardiogram showed mild diastolic dysfunction and mild mitral regurgitation.   She did see cardiology in the past for consultation but they did sign off and stated that she did not need to follow-up with them. She does have a history of breast cancer in the right breast diagnosed in 2002 and this was treated with mastectomy, chemotherapy, radiation and endocrine therapy. She took 4 years of tamoxifen and 2 years of an aromatase inhibitor. She did see Dr. Israel Jones in October 2020 for consultation regarding a change in her mammogram that showed an irregular area on the right breast that required biopsy. Biopsy showed apocrine metaplasia. Dr. Israel Jones told her that this was a benign lesion and some people do choose to have it removed but she elected not to have anything done at this time. She has not had any concerning breast changes. Her mammogram done in April 2022 is up-to-date. She will have this repeated in April of this year. She does have a history of intermittent hoarseness that was previously worked up by ENT. ENT did do a flexible laryngoscopy that was normal.  She was told that her hoarseness was secondary to her years of smoking. She does still complain of right leg pain and right hip pain. She did have this hip replaced in 2008. She states that she has had the symptoms for a long time. She did have an x-ray done several years ago that showed normal placement of the hip joint without any significant changes. I suspect that her symptoms may have been secondary to bursitis however today she tells me that she does wear flat shoes all the time. I suspect she may also have a leg length discrepancy which could be causing some of her symptoms. I recommended that she see podiatry for inserts into her shoes to ensure that her legs are the same length. She tells me that she uses Aleve very intermittently for this. She states that she will usually use Aleve twice a month. She does complain of some occasional dizziness but she is not too concerned with this as this is something that happens usually when she stands up and goes away quickly.   I did advise her to monitor her symptoms and to let me know if this worsens. She has no other concerns at this time. cmw    Review of Systems   Constitutional:  Positive for fatigue. Negative for appetite change, chills, fever and unexpected weight change. Has lost 6 lbs since her last visit   HENT:  Positive for voice change (hoarse voice intermittently with frequent clearing of the throat). Negative for congestion, ear discharge, ear pain, nosebleeds, postnasal drip, rhinorrhea, sinus pressure, sore throat, tinnitus and trouble swallowing. Eyes:  Negative for photophobia, pain, discharge, redness and visual disturbance. Respiratory:  Positive for cough (only occasional). Negative for chest tightness, shortness of breath and wheezing. Cardiovascular:  Positive for palpitations (occasional with stress). Negative for chest pain and leg swelling. Gastrointestinal:  Negative for abdominal distention, abdominal pain, blood in stool, constipation, diarrhea, nausea and vomiting. Endocrine: Negative for polydipsia, polyphagia and polyuria. History of hypothyroidism controlled with medication   Genitourinary:  Negative for decreased urine volume, difficulty urinating, dysuria, flank pain, frequency, hematuria, pelvic pain, urgency and vaginal discharge. History of left breast cancer   Musculoskeletal:  Positive for arthralgias (occasional). Negative for back pain and myalgias. Right leg pain occasionally on the outside of the hip   Skin:  Negative for color change and rash. Allergic/Immunologic: Negative for immunocompromised state. Neurological:  Positive for dizziness (occasionally). Negative for syncope, weakness, light-headedness and headaches. Hematological:  Negative for adenopathy. Does not bruise/bleed easily. Psychiatric/Behavioral:  Positive for dysphoric mood (ups and downs - overall ok). Negative for agitation, behavioral problems, confusion and sleep disturbance.  The patient is nervous/anxious. A lot of stress        Objective     Patient-Reported Vitals    No data recorded           [INSTRUCTIONS:  \"[x]\" Indicates a positive item  \"[]\" Indicates a negative item  -- DELETE ALL ITEMS NOT EXAMINED]    Constitutional: [x] Appears well-developed and well-nourished [x] No apparent distress      [] Abnormal -     Mental status: [x] Alert and awake  [x] Oriented to person/place/time [x] Able to follow commands    [] Abnormal -     Eyes:   EOM    [x]  Normal    [] Abnormal -   Sclera  [x]  Normal    [] Abnormal -          Discharge [x]  None visible   [] Abnormal -     HENT: [x] Normocephalic, atraumatic  [] Abnormal -   [x] Mouth/Throat: Mucous membranes are moist    External Ears [x] Normal  [] Abnormal -    Neck: [x] No visualized mass [] Abnormal -     Pulmonary/Chest: [x] Respiratory effort normal   [x] No visualized signs of difficulty breathing or respiratory distress        [] Abnormal -      Musculoskeletal:   [x] Normal gait with no signs of ataxia         [x] Normal range of motion of neck        [] Abnormal -     Neurological:        [x] No Facial Asymmetry (Cranial nerve 7 motor function) (limited exam due to video visit)          [x] No gaze palsy        [] Abnormal -          Skin:        [x] No significant exanthematous lesions or discoloration noted on facial skin         [] Abnormal -            Psychiatric:       [x] Normal Affect [] Abnormal -        [x] No Hallucinations    Other pertinent observable physical exam findings:-             Dez Vail, was evaluated through a synchronous (real-time) audio-video encounter. The patient (or guardian if applicable) is aware that this is a billable service, which includes applicable co-pays. This Virtual Visit was conducted with patient's (and/or legal guardian's) consent.  The visit was conducted pursuant to the emergency declaration under the [facility], [facility] waiver authority and the [employer] and [employer] General Act. Patient identification was verified, and a caregiver was present when appropriate. The patient was located at Home: [address] [place] [address] [address] [id number].      Provider was located at Home ([facility]): Jeffery Pappas MD

## 2023-01-25 NOTE — PROGRESS NOTES
Dez Vail (:  1950) is a Established patient, here for evaluation of the following:    Assessment & Plan   Below is the assessment and plan developed based on review of pertinent history, physical exam, labs, studies, and medications. {There are no diagnoses linked to this encounter. (Refresh or delete this SmartLink)}  No follow-ups on file. Subjective   HPI  Review of Systems       Objective   Patient-Reported Vitals  No data recorded     Physical Exam  {Virtual visit PE with detailed exam Optional:[id number]}       {Time Documentation Optional:[id number]}    Dez Vail, was evaluated through a synchronous (real-time) audio-video encounter. The patient (or guardian if applicable) is aware that this is a billable service, which includes applicable co-pays. This Virtual Visit was conducted with patient's (and/or legal guardian's) consent. The visit was conducted pursuant to the emergency declaration under the [address] [place] [address] authority and the [employer] and [email or web address] General Act. Patient identification was verified, and a caregiver was present when appropriate. The patient was located at {[employer] - Patient:[id number]}. Provider was located at {[employer] - Provider:[id number]}.         --Franck Ken

## 2023-03-28 ENCOUNTER — TELEPHONE (OUTPATIENT)
Dept: FAMILY MEDICINE CLINIC | Age: 73
End: 2023-03-28

## 2023-03-28 DIAGNOSIS — H91.90 HEARING LOSS, UNSPECIFIED HEARING LOSS TYPE, UNSPECIFIED LATERALITY: Primary | ICD-10-CM

## 2023-03-28 NOTE — TELEPHONE ENCOUNTER
Please verify if this is for diagnosis of hoarseness of voice?   If so please place diagnosis and associate with referral.  Resend for my approval.

## 2023-03-28 NOTE — TELEPHONE ENCOUNTER
Patient requesting ENT referral to [facility], [address] [facility] (ENT Phys)  [phone]  Fax: [phone]  Patient has appt April 26th, 2023

## 2023-03-29 NOTE — TELEPHONE ENCOUNTER
Patient states it is for her ears she has not been hearing as well. She is questioning if she is having some hearing loss.

## 2023-03-29 NOTE — TELEPHONE ENCOUNTER
If she is requesting Beth Jennings why does the referral have Dr. Donato Libman information? Looking online it appears as though Beth Jennings is not a CNP but she is an audiologist.  Can we verify this information? Does she have a consult with the audiologist for a hearing test?  Or is she seeing the doctor? If she doesn't know you may need to call the office to find out who she is seeing and who do they need a referral for. These are 2 different referrals and she may need both.

## 2023-04-06 DIAGNOSIS — Z90.12 HISTORY OF LEFT MASTECTOMY: ICD-10-CM

## 2023-04-06 DIAGNOSIS — Z12.31 BREAST CANCER SCREENING BY MAMMOGRAM: ICD-10-CM

## 2023-04-06 DIAGNOSIS — Z85.3 HISTORY OF BREAST CANCER IN FEMALE: ICD-10-CM

## 2023-04-25 ENCOUNTER — TELEPHONE (OUTPATIENT)
Dept: ONCOLOGY | Age: 73
End: 2023-04-25

## 2023-04-25 DIAGNOSIS — Z87.891 PERSONAL HISTORY OF NICOTINE DEPENDENCE: Primary | ICD-10-CM

## 2023-04-25 NOTE — TELEPHONE ENCOUNTER
Our records indicate that your patient is coming due for their annual lung cancer screening follow up testing. For your convenience, we have pended the order for the scan for you. If you do not agree with the need for the test, please cancel the order and let us know. Sincerely,    [address] [place] [address] Screening Program    Auto printed reminder letter sent to patient.

## 2023-05-26 ENCOUNTER — HOSPITAL ENCOUNTER (OUTPATIENT)
Dept: CT IMAGING | Facility: CLINIC | Age: 73
End: 2023-05-26
Payer: MEDICARE

## 2023-05-26 DIAGNOSIS — Z87.891 PERSONAL HISTORY OF NICOTINE DEPENDENCE: ICD-10-CM

## 2023-05-26 PROCEDURE — 71271 CT THORAX LUNG CANCER SCR C-: CPT

## 2023-07-07 DIAGNOSIS — E03.9 HYPOTHYROIDISM, UNSPECIFIED TYPE: ICD-10-CM

## 2023-07-07 DIAGNOSIS — E78.5 HYPERLIPIDEMIA, UNSPECIFIED HYPERLIPIDEMIA TYPE: ICD-10-CM

## 2023-07-07 RX ORDER — SIMVASTATIN 40 MG
40 TABLET ORAL NIGHTLY
Qty: 90 TABLET | Refills: 1 | Status: SHIPPED | OUTPATIENT
Start: 2023-07-07 | End: 2024-07-06

## 2023-07-07 RX ORDER — LEVOTHYROXINE SODIUM 88 UG/1
TABLET ORAL
Qty: 90 TABLET | Refills: 1 | Status: SHIPPED | OUTPATIENT
Start: 2023-07-07

## 2023-07-07 NOTE — TELEPHONE ENCOUNTER
LOV  1/25/23  RTO 8/24/23  LRF   1/16/23        Controlled Substance Monitoring:    Acute and Chronic Pain Monitoring:   No flowsheet data found.

## 2023-08-21 SDOH — ECONOMIC STABILITY: FOOD INSECURITY: WITHIN THE PAST 12 MONTHS, THE FOOD YOU BOUGHT JUST DIDN'T LAST AND YOU DIDN'T HAVE MONEY TO GET MORE.: NEVER TRUE

## 2023-08-21 SDOH — ECONOMIC STABILITY: TRANSPORTATION INSECURITY
IN THE PAST 12 MONTHS, HAS LACK OF TRANSPORTATION KEPT YOU FROM MEETINGS, WORK, OR FROM GETTING THINGS NEEDED FOR DAILY LIVING?: NO

## 2023-08-21 SDOH — ECONOMIC STABILITY: HOUSING INSECURITY
IN THE LAST 12 MONTHS, WAS THERE A TIME WHEN YOU DID NOT HAVE A STEADY PLACE TO SLEEP OR SLEPT IN A SHELTER (INCLUDING NOW)?: NO

## 2023-08-21 SDOH — ECONOMIC STABILITY: FOOD INSECURITY: WITHIN THE PAST 12 MONTHS, YOU WORRIED THAT YOUR FOOD WOULD RUN OUT BEFORE YOU GOT MONEY TO BUY MORE.: NEVER TRUE

## 2023-08-21 SDOH — ECONOMIC STABILITY: INCOME INSECURITY: HOW HARD IS IT FOR YOU TO PAY FOR THE VERY BASICS LIKE FOOD, HOUSING, MEDICAL CARE, AND HEATING?: NOT HARD AT ALL

## 2023-08-24 ENCOUNTER — OFFICE VISIT (OUTPATIENT)
Dept: FAMILY MEDICINE CLINIC | Age: 73
End: 2023-08-24

## 2023-08-24 ENCOUNTER — HOSPITAL ENCOUNTER (OUTPATIENT)
Age: 73
Setting detail: SPECIMEN
Discharge: HOME | End: 2023-08-24

## 2023-08-24 VITALS
HEART RATE: 81 BPM | SYSTOLIC BLOOD PRESSURE: 122 MMHG | BODY MASS INDEX: 29.27 KG/M2 | RESPIRATION RATE: 16 BRPM | WEIGHT: 162.6 LBS | DIASTOLIC BLOOD PRESSURE: 80 MMHG | TEMPERATURE: 97.2 F

## 2023-08-24 DIAGNOSIS — R63.5 WEIGHT GAIN: ICD-10-CM

## 2023-08-24 DIAGNOSIS — R49.0 HOARSENESS OF VOICE: ICD-10-CM

## 2023-08-24 DIAGNOSIS — E78.5 HYPERLIPIDEMIA, UNSPECIFIED HYPERLIPIDEMIA TYPE: ICD-10-CM

## 2023-08-24 DIAGNOSIS — Z87.891 PERSONAL HISTORY OF SMOKING: ICD-10-CM

## 2023-08-24 DIAGNOSIS — E03.9 HYPOTHYROIDISM, UNSPECIFIED TYPE: ICD-10-CM

## 2023-08-24 DIAGNOSIS — M81.0 AGE-RELATED OSTEOPOROSIS WITHOUT CURRENT PATHOLOGICAL FRACTURE: ICD-10-CM

## 2023-08-24 DIAGNOSIS — F41.9 ANXIETY: ICD-10-CM

## 2023-08-24 DIAGNOSIS — E55.9 VITAMIN D DEFICIENCY: ICD-10-CM

## 2023-08-24 DIAGNOSIS — N18.31 STAGE 3A CHRONIC KIDNEY DISEASE (HCC): ICD-10-CM

## 2023-08-24 DIAGNOSIS — M79.604 RIGHT LEG PAIN: ICD-10-CM

## 2023-08-24 DIAGNOSIS — R31.29 MICROHEMATURIA: ICD-10-CM

## 2023-08-24 DIAGNOSIS — R06.02 EXERTIONAL SHORTNESS OF BREATH: ICD-10-CM

## 2023-08-24 DIAGNOSIS — N60.81 APOCRINE METAPLASIA OF RIGHT BREAST: ICD-10-CM

## 2023-08-24 DIAGNOSIS — F43.9 STRESS AT HOME: ICD-10-CM

## 2023-08-24 DIAGNOSIS — R45.89 DEPRESSED MOOD: ICD-10-CM

## 2023-08-24 DIAGNOSIS — N20.0 KIDNEY STONES: ICD-10-CM

## 2023-08-24 DIAGNOSIS — Z85.3 HISTORY OF BREAST CANCER IN FEMALE: ICD-10-CM

## 2023-08-24 DIAGNOSIS — R73.03 PREDIABETES: ICD-10-CM

## 2023-08-24 DIAGNOSIS — M21.70 ACQUIRED LEG LENGTH DISCREPANCY: ICD-10-CM

## 2023-08-24 DIAGNOSIS — J43.1 PANLOBULAR EMPHYSEMA (HCC): ICD-10-CM

## 2023-08-24 DIAGNOSIS — K76.0 FATTY LIVER: ICD-10-CM

## 2023-08-24 DIAGNOSIS — Z90.12 HISTORY OF LEFT MASTECTOMY: ICD-10-CM

## 2023-08-24 DIAGNOSIS — I25.10 CORONARY ARTERY CALCIFICATION SEEN ON CT SCAN: ICD-10-CM

## 2023-08-24 DIAGNOSIS — I51.89 DIASTOLIC DYSFUNCTION: ICD-10-CM

## 2023-08-24 DIAGNOSIS — K80.20 CALCULUS OF GALLBLADDER WITHOUT CHOLECYSTITIS WITHOUT OBSTRUCTION: ICD-10-CM

## 2023-08-24 DIAGNOSIS — R01.1 HEART MURMUR: ICD-10-CM

## 2023-08-24 DIAGNOSIS — R91.1 NODULE OF LEFT LUNG: ICD-10-CM

## 2023-08-24 DIAGNOSIS — Z96.641 HISTORY OF TOTAL RIGHT HIP REPLACEMENT: ICD-10-CM

## 2023-08-24 DIAGNOSIS — I34.0 MILD MITRAL REGURGITATION BY PRIOR ECHOCARDIOGRAM: ICD-10-CM

## 2023-08-24 DIAGNOSIS — J41.0 SIMPLE CHRONIC BRONCHITIS (HCC): ICD-10-CM

## 2023-08-24 DIAGNOSIS — E03.9 HYPOTHYROIDISM, UNSPECIFIED TYPE: Primary | ICD-10-CM

## 2023-08-24 LAB
T4 FREE SERPL-MCNC: 1.3 NG/DL (ref 0.9–1.7)
TSH SERPL DL<=0.05 MIU/L-ACNC: 1.3 UIU/ML (ref 0.3–5)

## 2023-08-24 RX ORDER — BUTYROSPERMUM PARKII(SHEA BUTTER), SIMMONDSIA CHINENSIS (JOJOBA) SEED OIL, ALOE BARBADENSIS LEAF EXTRACT .01; 1; 3.5 G/100G; G/100G; G/100G
1 LIQUID TOPICAL DAILY
Qty: 30 CAPSULE | Refills: 11 | COMMUNITY
Start: 2023-08-24 | End: 2024-08-24

## 2023-08-24 ASSESSMENT — ENCOUNTER SYMPTOMS
VOICE CHANGE: 1
ABDOMINAL PAIN: 0
RHINORRHEA: 0
CONSTIPATION: 0
SORE THROAT: 0
TROUBLE SWALLOWING: 0
DIARRHEA: 0
EYE PAIN: 0
NAUSEA: 0
COLOR CHANGE: 0
CHEST TIGHTNESS: 0
EYE DISCHARGE: 0
SINUS PRESSURE: 0
ABDOMINAL DISTENTION: 0
BACK PAIN: 0
BLOOD IN STOOL: 0
VOMITING: 0
WHEEZING: 0
COUGH: 1
EYE REDNESS: 0
PHOTOPHOBIA: 0

## 2023-08-24 NOTE — PROGRESS NOTES
Ollie Caballero (:  1950) is a 67 y.o. female,Established patient, here for evaluation of the following chief complaint(s):    Cholesterol Problem and Hypothyroidism         ASSESSMENT/PLAN:    1. Hypothyroidism, unspecified type  -     TSH; Future  -     T4, Free; Future  2. Hyperlipidemia, unspecified hyperlipidemia type  -     CT CARDIAC CALCIUM SCORING; Future  -     Comprehensive Metabolic Panel; Future  3. Prediabetes  4. Stage 3a chronic kidney disease ([address])  5. Kidney stones  6. Age-related osteoporosis without current pathological fracture  7. Vitamin D deficiency  8. Depressed mood  9. Anxiety  10. Stress at home  11. Personal history of smoking  -     Full PFT Study With Bronchodilator; Future  -     XR CHEST STANDARD (2 VW); Future  12. Simple chronic bronchitis ([address])  -     Full PFT Study With Bronchodilator; Future  -     XR CHEST STANDARD (2 VW); Future  13. Nodule of left lung  -     Full PFT Study With Bronchodilator; Future  -     XR CHEST STANDARD (2 VW); Future  14. Panlobular emphysema ([address])  -     Full PFT Study With Bronchodilator; Future  -     XR CHEST STANDARD (2 VW); Future  15. Exertional shortness of breath  -     Full PFT Study With Bronchodilator; Future  -     XR CHEST STANDARD (2 VW); Future  -     CT CARDIAC CALCIUM SCORING; Future  -     Echo (TTE) Limited; Future  16. Coronary artery calcification seen on CT scan  -     CT CARDIAC CALCIUM SCORING; Future  -     Echo (TTE) Limited; Future  17. Heart murmur  -     Echo (TTE) Limited; Future  18. Diastolic dysfunction  -     Echo (TTE) Limited; Future  19. Mild mitral regurgitation by prior echocardiogram  -     Echo (TTE) Limited; Future  20. Weight gain  21. Microhematuria  22. Fatty liver  23. Calculus of gallbladder without cholecystitis without obstruction  24. History of breast cancer in female  22. History of left mastectomy  26. Apocrine metaplasia of right breast  27. Right leg pain  28.  History of total right hip

## 2023-08-25 LAB
ALBUMIN SERPL-MCNC: 4.7 G/DL (ref 3.5–5.2)
ALBUMIN/GLOB SERPL: 1.7 {RATIO} (ref 1–2.5)
ALP SERPL-CCNC: 107 U/L (ref 35–104)
ALT SERPL-CCNC: 20 U/L (ref 5–33)
ANION GAP SERPL CALCULATED.3IONS-SCNC: 18 MMOL/L (ref 9–17)
AST SERPL-CCNC: 25 U/L
BILIRUB SERPL-MCNC: 0.3 MG/DL (ref 0.3–1.2)
BUN SERPL-MCNC: 13 MG/DL (ref 8–23)
CALCIUM SERPL-MCNC: 9.7 MG/DL (ref 8.6–10.4)
CHLORIDE SERPL-SCNC: 101 MMOL/L (ref 98–107)
CO2 SERPL-SCNC: 23 MMOL/L (ref 20–31)
CREAT SERPL-MCNC: 1 MG/DL (ref 0.5–0.9)
GFR SERPL CREATININE-BSD FRML MDRD: 59 ML/MIN/1.73M2
GLUCOSE SERPL-MCNC: 77 MG/DL (ref 70–99)
POTASSIUM SERPL-SCNC: 3.9 MMOL/L (ref 3.7–5.3)
PROT SERPL-MCNC: 7.4 G/DL (ref 6.4–8.3)
SODIUM SERPL-SCNC: 142 MMOL/L (ref 135–144)

## 2023-08-27 ASSESSMENT — ENCOUNTER SYMPTOMS
STRIDOR: 0
SHORTNESS OF BREATH: 1

## 2023-08-29 DIAGNOSIS — N18.31 STAGE 3A CHRONIC KIDNEY DISEASE (HCC): Primary | ICD-10-CM

## 2023-08-29 DIAGNOSIS — E78.5 HYPERLIPIDEMIA, UNSPECIFIED HYPERLIPIDEMIA TYPE: ICD-10-CM

## 2023-08-29 DIAGNOSIS — E03.9 HYPOTHYROIDISM, UNSPECIFIED TYPE: ICD-10-CM

## 2023-08-29 DIAGNOSIS — R73.03 PREDIABETES: ICD-10-CM

## 2023-09-07 ENCOUNTER — HOSPITAL ENCOUNTER (OUTPATIENT)
Dept: CT IMAGING | Age: 73
Discharge: HOME | End: 2023-09-09
Payer: MEDICARE

## 2023-09-07 DIAGNOSIS — I25.10 CORONARY ARTERY CALCIFICATION SEEN ON CT SCAN: ICD-10-CM

## 2023-09-07 DIAGNOSIS — R06.02 EXERTIONAL SHORTNESS OF BREATH: ICD-10-CM

## 2023-09-07 DIAGNOSIS — E78.5 HYPERLIPIDEMIA, UNSPECIFIED HYPERLIPIDEMIA TYPE: ICD-10-CM

## 2023-09-07 PROCEDURE — 75571 CT HRT W/O DYE W/CA TEST: CPT

## 2023-09-12 ENCOUNTER — TELEPHONE (OUTPATIENT)
Dept: FAMILY MEDICINE CLINIC | Age: 73
End: 2023-09-12

## 2023-09-12 DIAGNOSIS — E78.5 HYPERLIPIDEMIA, UNSPECIFIED HYPERLIPIDEMIA TYPE: ICD-10-CM

## 2023-09-12 DIAGNOSIS — I51.89 DIASTOLIC DYSFUNCTION: ICD-10-CM

## 2023-09-12 DIAGNOSIS — R01.1 HEART MURMUR: ICD-10-CM

## 2023-09-12 DIAGNOSIS — I34.0 MILD MITRAL REGURGITATION BY PRIOR ECHOCARDIOGRAM: ICD-10-CM

## 2023-09-12 DIAGNOSIS — R93.1 ELEVATED CORONARY ARTERY CALCIUM SCORE: Primary | ICD-10-CM

## 2023-09-12 PROBLEM — I25.10 PLAQUE IN HEART ARTERY: Status: ACTIVE | Noted: 2023-09-12

## 2023-09-12 NOTE — TELEPHONE ENCOUNTER
Cardio referral pended per result note.   AMB will fax when conmplete  Patient is scheduled for ECHO and CXR scheduled 10/03/2023

## 2023-10-03 ENCOUNTER — HOSPITAL ENCOUNTER (OUTPATIENT)
Age: 73
Discharge: HOME | End: 2023-10-05
Payer: MEDICARE

## 2023-10-03 ENCOUNTER — HOSPITAL ENCOUNTER (OUTPATIENT)
Dept: PULMONOLOGY | Age: 73
Discharge: HOME | End: 2023-10-03
Payer: MEDICARE

## 2023-10-03 ENCOUNTER — HOSPITAL ENCOUNTER (OUTPATIENT)
Dept: GENERAL RADIOLOGY | Age: 73
Discharge: HOME | End: 2023-10-05
Payer: MEDICARE

## 2023-10-03 DIAGNOSIS — R91.1 NODULE OF LEFT LUNG: ICD-10-CM

## 2023-10-03 DIAGNOSIS — R06.02 EXERTIONAL SHORTNESS OF BREATH: ICD-10-CM

## 2023-10-03 DIAGNOSIS — I25.10 CORONARY ARTERY CALCIFICATION SEEN ON CT SCAN: ICD-10-CM

## 2023-10-03 DIAGNOSIS — J41.0 SIMPLE CHRONIC BRONCHITIS (HCC): ICD-10-CM

## 2023-10-03 DIAGNOSIS — Z87.891 PERSONAL HISTORY OF SMOKING: ICD-10-CM

## 2023-10-03 DIAGNOSIS — I51.89 DIASTOLIC DYSFUNCTION: ICD-10-CM

## 2023-10-03 DIAGNOSIS — R01.1 HEART MURMUR: ICD-10-CM

## 2023-10-03 DIAGNOSIS — I34.0 MILD MITRAL REGURGITATION BY PRIOR ECHOCARDIOGRAM: ICD-10-CM

## 2023-10-03 DIAGNOSIS — J43.1 PANLOBULAR EMPHYSEMA (HCC): ICD-10-CM

## 2023-10-03 LAB
DLCO %PRED: NORMAL
DLCO PRED: NORMAL
DLCO/VA %PRED: NORMAL
DLCO/VA PRED: NORMAL
DLCO/VA: NORMAL
DLCO: NORMAL
ECHO AO ROOT DIAM: 2.1 CM
ECHO AV MEAN GRADIENT: 4 MMHG
ECHO AV MEAN VELOCITY: 0.9 M/S
ECHO AV PEAK GRADIENT: 8 MMHG
ECHO AV PEAK VELOCITY: 1.4 M/S
ECHO AV VELOCITY RATIO: 1.07
ECHO AV VTI: 27.5 CM
ECHO LA AREA 2C: 12.7 CM2
ECHO LA AREA 4C: 10.4 CM2
ECHO LA DIAMETER: 3 CM
ECHO LA MAJOR AXIS: 4.3 CM
ECHO LA MINOR AXIS: 4.6 CM
ECHO LA TO AORTIC ROOT RATIO: 1.43
ECHO LA VOL 2C: 29 ML (ref 22–52)
ECHO LA VOL 4C: 19 ML (ref 22–52)
ECHO LA VOL BP: 25 ML (ref 22–52)
ECHO LV E' LATERAL VELOCITY: 8 CM/S
ECHO LV E' SEPTAL VELOCITY: 5 CM/S
ECHO LV FRACTIONAL SHORTENING: 36 % (ref 28–44)
ECHO LV INTERNAL DIMENSION DIASTOLIC: 3.3 CM (ref 3.9–5.3)
ECHO LV INTERNAL DIMENSION SYSTOLIC: 2.1 CM
ECHO LV IVSD: 1 CM (ref 0.6–0.9)
ECHO LV MASS 2D: 87.7 G (ref 67–162)
ECHO LV POSTERIOR WALL DIASTOLIC: 0.9 CM (ref 0.6–0.9)
ECHO LV RELATIVE WALL THICKNESS RATIO: 0.55
ECHO LVOT PEAK GRADIENT: 9 MMHG
ECHO LVOT PEAK VELOCITY: 1.5 M/S
ECHO MV A VELOCITY: 1.05 M/S
ECHO MV E DECELERATION TIME (DT): 268 MS
ECHO MV E VELOCITY: 0.84 M/S
ECHO MV E/A RATIO: 0.8
ECHO MV E/E' LATERAL: 10.5
ECHO MV E/E' RATIO (AVERAGED): 13.65
ECHO MV E/E' SEPTAL: 16.8
EXPIRATORY TIME-POST: NORMAL
EXPIRATORY TIME: NORMAL
FEF 25-75% %CHNG: NORMAL
FEF 25-75% %PRED-POST: NORMAL
FEF 25-75% %PRED-PRE: NORMAL
FEF 25-75% PRED: NORMAL
FEF 25-75%-POST: NORMAL
FEF 25-75%-PRE: NORMAL
FEV1 %PRED-POST: NORMAL
FEV1 %PRED-PRE: NORMAL
FEV1 PRED: NORMAL
FEV1-POST: NORMAL
FEV1-PRE: NORMAL
FEV1/FVC %PRED-POST: NORMAL
FEV1/FVC %PRED-PRE: NORMAL
FEV1/FVC PRED: NORMAL
FEV1/FVC-POST: NORMAL
FEV1/FVC-PRE: NORMAL
FVC %PRED-POST: NORMAL
FVC %PRED-PRE: NORMAL
FVC PRED: NORMAL
FVC-POST: NORMAL
FVC-PRE: NORMAL
GAW %PRED: NORMAL
GAW PRED: NORMAL
GAW: NORMAL
IC %PRED: NORMAL
IC PRED: NORMAL
IC: NORMAL
MEP: NORMAL
MIP: NORMAL
MVV %PRED-PRE: NORMAL
MVV PRED: NORMAL
MVV-PRE: NORMAL
PEF %PRED-POST: NORMAL
PEF %PRED-PRE: NORMAL
PEF PRED: NORMAL
PEF%CHNG: NORMAL
PEF-POST: NORMAL
PEF-PRE: NORMAL
RAW %PRED: NORMAL
RAW PRED: NORMAL
RAW: NORMAL
RV %PRED: NORMAL
RV PRED: NORMAL
RV: NORMAL
SVC %PRED: NORMAL
SVC PRED: NORMAL
SVC: NORMAL
TLC %PRED: NORMAL
TLC PRED: NORMAL
TLC: NORMAL
VA %PRED: NORMAL
VA PRED: NORMAL
VA: NORMAL
VTG %PRED: NORMAL
VTG PRED: NORMAL
VTG: NORMAL

## 2023-10-03 PROCEDURE — 94060 EVALUATION OF WHEEZING: CPT

## 2023-10-03 PROCEDURE — 94726 PLETHYSMOGRAPHY LUNG VOLUMES: CPT

## 2023-10-03 PROCEDURE — 94729 DIFFUSING CAPACITY: CPT

## 2023-10-03 PROCEDURE — 93306 TTE W/DOPPLER COMPLETE: CPT | Performed by: INTERNAL MEDICINE

## 2023-10-03 PROCEDURE — 71046 X-RAY EXAM CHEST 2 VIEWS: CPT

## 2023-10-03 PROCEDURE — 6370000000 HC RX 637 (ALT 250 FOR IP): Performed by: PEDIATRICS

## 2023-10-03 PROCEDURE — 93306 TTE W/DOPPLER COMPLETE: CPT

## 2023-10-03 RX ORDER — ALBUTEROL SULFATE 90 UG/1
2 AEROSOL, METERED RESPIRATORY (INHALATION) ONCE
Status: COMPLETED | OUTPATIENT
Start: 2023-10-03 | End: 2023-10-03

## 2023-10-03 RX ADMIN — ALBUTEROL SULFATE 2 PUFF: 90 AEROSOL, METERED RESPIRATORY (INHALATION) at 08:29

## 2023-10-03 NOTE — PROCEDURES
Impression:      Mild obstructive pulmonary disease with air trapping, minimal diffusion impairment and significant improvement in FVC following bronchodilators. Clinical correlation is recommended.     Danny Lee MD  Pulmonary critical Care and sleep

## 2023-10-19 SDOH — HEALTH STABILITY: PHYSICAL HEALTH: ON AVERAGE, HOW MANY DAYS PER WEEK DO YOU ENGAGE IN MODERATE TO STRENUOUS EXERCISE (LIKE A BRISK WALK)?: 0 DAYS

## 2023-10-19 ASSESSMENT — PATIENT HEALTH QUESTIONNAIRE - PHQ9
SUM OF ALL RESPONSES TO PHQ QUESTIONS 1-9: 1
2. FEELING DOWN, DEPRESSED OR HOPELESS: 1
SUM OF ALL RESPONSES TO PHQ QUESTIONS 1-9: 1
1. LITTLE INTEREST OR PLEASURE IN DOING THINGS: 0
SUM OF ALL RESPONSES TO PHQ9 QUESTIONS 1 & 2: 1

## 2023-10-19 ASSESSMENT — LIFESTYLE VARIABLES
HOW OFTEN DO YOU HAVE A DRINK CONTAINING ALCOHOL: 1
HOW OFTEN DO YOU HAVE SIX OR MORE DRINKS ON ONE OCCASION: 1
HOW MANY STANDARD DRINKS CONTAINING ALCOHOL DO YOU HAVE ON A TYPICAL DAY: PATIENT DOES NOT DRINK
HOW OFTEN DO YOU HAVE A DRINK CONTAINING ALCOHOL: NEVER
HOW MANY STANDARD DRINKS CONTAINING ALCOHOL DO YOU HAVE ON A TYPICAL DAY: 0

## 2023-10-26 ENCOUNTER — OFFICE VISIT (OUTPATIENT)
Dept: FAMILY MEDICINE CLINIC | Age: 73
End: 2023-10-26
Payer: MEDICARE

## 2023-10-26 ENCOUNTER — HOSPITAL ENCOUNTER (OUTPATIENT)
Age: 73
Setting detail: SPECIMEN
Discharge: HOME | End: 2023-10-26

## 2023-10-26 VITALS
SYSTOLIC BLOOD PRESSURE: 112 MMHG | TEMPERATURE: 96.7 F | OXYGEN SATURATION: 95 % | BODY MASS INDEX: 29.12 KG/M2 | DIASTOLIC BLOOD PRESSURE: 72 MMHG | HEART RATE: 76 BPM | WEIGHT: 161.8 LBS

## 2023-10-26 VITALS — DIASTOLIC BLOOD PRESSURE: 72 MMHG | TEMPERATURE: 96.7 F | SYSTOLIC BLOOD PRESSURE: 112 MMHG | HEART RATE: 76 BPM

## 2023-10-26 DIAGNOSIS — K80.20 CALCULUS OF GALLBLADDER WITHOUT CHOLECYSTITIS WITHOUT OBSTRUCTION: ICD-10-CM

## 2023-10-26 DIAGNOSIS — E78.5 HYPERLIPIDEMIA, UNSPECIFIED HYPERLIPIDEMIA TYPE: ICD-10-CM

## 2023-10-26 DIAGNOSIS — I25.10 MILD CORONARY ARTERY DISEASE: ICD-10-CM

## 2023-10-26 DIAGNOSIS — Z12.12 SCREENING FOR COLORECTAL CANCER: ICD-10-CM

## 2023-10-26 DIAGNOSIS — K76.0 FATTY LIVER: ICD-10-CM

## 2023-10-26 DIAGNOSIS — Z00.00 MEDICARE ANNUAL WELLNESS VISIT, SUBSEQUENT: Primary | ICD-10-CM

## 2023-10-26 DIAGNOSIS — F41.9 ANXIETY: ICD-10-CM

## 2023-10-26 DIAGNOSIS — R73.03 PREDIABETES: ICD-10-CM

## 2023-10-26 DIAGNOSIS — M81.0 AGE-RELATED OSTEOPOROSIS WITHOUT CURRENT PATHOLOGICAL FRACTURE: ICD-10-CM

## 2023-10-26 DIAGNOSIS — Z85.3 HISTORY OF BREAST CANCER IN FEMALE: ICD-10-CM

## 2023-10-26 DIAGNOSIS — R31.29 MICROHEMATURIA: ICD-10-CM

## 2023-10-26 DIAGNOSIS — R91.1 NODULE OF LEFT LUNG: ICD-10-CM

## 2023-10-26 DIAGNOSIS — Z87.891 PERSONAL HISTORY OF SMOKING: ICD-10-CM

## 2023-10-26 DIAGNOSIS — N18.31 STAGE 3A CHRONIC KIDNEY DISEASE (HCC): ICD-10-CM

## 2023-10-26 DIAGNOSIS — R01.1 HEART MURMUR: ICD-10-CM

## 2023-10-26 DIAGNOSIS — E03.9 HYPOTHYROIDISM, UNSPECIFIED TYPE: Primary | ICD-10-CM

## 2023-10-26 DIAGNOSIS — Z90.12 HISTORY OF LEFT MASTECTOMY: ICD-10-CM

## 2023-10-26 DIAGNOSIS — F43.9 STRESS AT HOME: ICD-10-CM

## 2023-10-26 DIAGNOSIS — R06.02 EXERTIONAL SHORTNESS OF BREATH: ICD-10-CM

## 2023-10-26 DIAGNOSIS — N20.0 KIDNEY STONES: ICD-10-CM

## 2023-10-26 DIAGNOSIS — I51.89 DIASTOLIC DYSFUNCTION: ICD-10-CM

## 2023-10-26 DIAGNOSIS — K21.00 GASTROESOPHAGEAL REFLUX DISEASE WITH ESOPHAGITIS WITHOUT HEMORRHAGE: ICD-10-CM

## 2023-10-26 DIAGNOSIS — Z12.11 SCREENING FOR COLORECTAL CANCER: ICD-10-CM

## 2023-10-26 DIAGNOSIS — I34.0 MILD MITRAL REGURGITATION BY PRIOR ECHOCARDIOGRAM: ICD-10-CM

## 2023-10-26 DIAGNOSIS — E55.9 VITAMIN D DEFICIENCY: ICD-10-CM

## 2023-10-26 DIAGNOSIS — J44.9 COPD, MILD (HCC): ICD-10-CM

## 2023-10-26 DIAGNOSIS — I25.10 CORONARY ARTERY CALCIFICATION SEEN ON CT SCAN: ICD-10-CM

## 2023-10-26 DIAGNOSIS — R93.1 ELEVATED CORONARY ARTERY CALCIUM SCORE: ICD-10-CM

## 2023-10-26 DIAGNOSIS — R45.89 DEPRESSED MOOD: ICD-10-CM

## 2023-10-26 DIAGNOSIS — E03.9 HYPOTHYROIDISM, UNSPECIFIED TYPE: ICD-10-CM

## 2023-10-26 DIAGNOSIS — R63.5 WEIGHT GAIN: ICD-10-CM

## 2023-10-26 LAB
ALBUMIN SERPL-MCNC: 4.5 G/DL (ref 3.5–5.2)
ALBUMIN/GLOB SERPL: 1.3 {RATIO} (ref 1–2.5)
ALP SERPL-CCNC: 108 U/L (ref 35–104)
ALT SERPL-CCNC: 27 U/L (ref 5–33)
ANION GAP SERPL CALCULATED.3IONS-SCNC: 13 MMOL/L (ref 9–17)
AST SERPL-CCNC: 22 U/L
BILIRUB SERPL-MCNC: 0.5 MG/DL (ref 0.3–1.2)
BUN SERPL-MCNC: 14 MG/DL (ref 8–23)
CALCIUM SERPL-MCNC: 10.6 MG/DL (ref 8.6–10.4)
CHLORIDE SERPL-SCNC: 98 MMOL/L (ref 98–107)
CO2 SERPL-SCNC: 27 MMOL/L (ref 20–31)
CREAT SERPL-MCNC: 1.2 MG/DL (ref 0.5–0.9)
EST. AVERAGE GLUCOSE BLD GHB EST-MCNC: 143 MG/DL
GFR SERPL CREATININE-BSD FRML MDRD: 48 ML/MIN/1.73M2
GLUCOSE SERPL-MCNC: 85 MG/DL (ref 70–99)
HBA1C MFR BLD: 6.6 % (ref 4–6)
POTASSIUM SERPL-SCNC: 4.5 MMOL/L (ref 3.7–5.3)
PROT SERPL-MCNC: 7.9 G/DL (ref 6.4–8.3)
SODIUM SERPL-SCNC: 138 MMOL/L (ref 135–144)
T4 FREE SERPL-MCNC: 1.2 NG/DL (ref 0.9–1.7)
TSH SERPL DL<=0.05 MIU/L-ACNC: 3.2 UIU/ML (ref 0.3–5)

## 2023-10-26 PROCEDURE — 1036F TOBACCO NON-USER: CPT | Performed by: PEDIATRICS

## 2023-10-26 PROCEDURE — 1090F PRES/ABSN URINE INCON ASSESS: CPT | Performed by: PEDIATRICS

## 2023-10-26 PROCEDURE — 3017F COLORECTAL CA SCREEN DOC REV: CPT | Performed by: PEDIATRICS

## 2023-10-26 PROCEDURE — 3023F SPIROM DOC REV: CPT | Performed by: PEDIATRICS

## 2023-10-26 PROCEDURE — G0439 PPPS, SUBSEQ VISIT: HCPCS | Performed by: PEDIATRICS

## 2023-10-26 PROCEDURE — 1123F ACP DISCUSS/DSCN MKR DOCD: CPT | Performed by: PEDIATRICS

## 2023-10-26 PROCEDURE — G8400 PT W/DXA NO RESULTS DOC: HCPCS | Performed by: PEDIATRICS

## 2023-10-26 PROCEDURE — G8484 FLU IMMUNIZE NO ADMIN: HCPCS | Performed by: PEDIATRICS

## 2023-10-26 PROCEDURE — G8427 DOCREV CUR MEDS BY ELIG CLIN: HCPCS | Performed by: PEDIATRICS

## 2023-10-26 PROCEDURE — G8417 CALC BMI ABV UP PARAM F/U: HCPCS | Performed by: PEDIATRICS

## 2023-10-26 PROCEDURE — 99214 OFFICE O/P EST MOD 30 MIN: CPT | Performed by: PEDIATRICS

## 2023-10-26 RX ORDER — CITALOPRAM HYDROBROMIDE 10 MG/1
10 TABLET ORAL DAILY
Qty: 30 TABLET | Refills: 5 | Status: SHIPPED | OUTPATIENT
Start: 2023-10-26

## 2023-10-26 RX ORDER — PANTOPRAZOLE SODIUM 40 MG/1
40 TABLET, DELAYED RELEASE ORAL
Qty: 30 TABLET | Refills: 2 | Status: SHIPPED | OUTPATIENT
Start: 2023-10-26

## 2023-10-26 ASSESSMENT — ENCOUNTER SYMPTOMS
RHINORRHEA: 0
EYE PAIN: 0
EYE REDNESS: 0
PHOTOPHOBIA: 0
CONSTIPATION: 0
WHEEZING: 0
ABDOMINAL PAIN: 0
VOICE CHANGE: 1
DIARRHEA: 0
COUGH: 1
CHEST TIGHTNESS: 0
SORE THROAT: 0
EYE DISCHARGE: 0
BACK PAIN: 0
VOMITING: 0
COLOR CHANGE: 0
STRIDOR: 0
SHORTNESS OF BREATH: 1
SINUS PRESSURE: 0
TROUBLE SWALLOWING: 0
NAUSEA: 0
ABDOMINAL DISTENTION: 0
BLOOD IN STOOL: 0

## 2023-10-26 NOTE — PROGRESS NOTES
Psychiatric:         Attention and Perception: Attention normal.         Mood and Affect: Mood is anxious and depressed. Speech: Speech normal.         Behavior: Behavior normal.         Thought Content: Thought content normal.         Judgment: Judgment normal.            On this date 10/26/2023 I have spent over 40 minutes reviewing previous notes, test results and face to face with the patient discussing the diagnosis and importance of compliance with the treatment plan as well as documenting on the day of the visit. An electronic signature was used to authenticate this note.     --Tex Banerjee MD

## 2023-11-09 DIAGNOSIS — Z12.11 SCREENING FOR COLORECTAL CANCER: ICD-10-CM

## 2023-11-09 DIAGNOSIS — Z12.12 SCREENING FOR COLORECTAL CANCER: ICD-10-CM

## 2023-11-09 LAB
CONTROL: PRESENT
FECAL BLOOD IMMUNOCHEMICAL TEST: POSITIVE

## 2023-11-09 PROCEDURE — 82274 ASSAY TEST FOR BLOOD FECAL: CPT | Performed by: PEDIATRICS

## 2023-11-10 DIAGNOSIS — K92.1 BLOOD IN STOOL: Primary | ICD-10-CM

## 2023-11-20 ENCOUNTER — HOSPITAL ENCOUNTER (OUTPATIENT)
Age: 73
Setting detail: SPECIMEN
Discharge: HOME | End: 2023-11-20

## 2023-11-20 DIAGNOSIS — K92.1 BLOOD IN STOOL: ICD-10-CM

## 2023-11-20 LAB
ERYTHROCYTE [DISTWIDTH] IN BLOOD BY AUTOMATED COUNT: 13.1 % (ref 11.8–14.4)
FERRITIN SERPL-MCNC: 101 NG/ML (ref 13–150)
HCT VFR BLD AUTO: 43.9 % (ref 36.3–47.1)
HGB BLD-MCNC: 14.1 G/DL (ref 11.9–15.1)
IRON SATN MFR SERPL: 23 % (ref 20–55)
IRON SERPL-MCNC: 83 UG/DL (ref 37–145)
MCH RBC QN AUTO: 31.7 PG (ref 25.2–33.5)
MCHC RBC AUTO-ENTMCNC: 32.1 G/DL (ref 28.4–34.8)
MCV RBC AUTO: 98.7 FL (ref 82.6–102.9)
NRBC BLD-RTO: 0 PER 100 WBC
PLATELET # BLD AUTO: 186 K/UL (ref 138–453)
PMV BLD AUTO: 11.7 FL (ref 8.1–13.5)
RBC # BLD AUTO: 4.45 M/UL (ref 3.95–5.11)
TIBC SERPL-MCNC: 359 UG/DL (ref 250–450)
UNSATURATED IRON BINDING CAPACITY: 276 UG/DL (ref 112–347)
WBC OTHER # BLD: 6.9 K/UL (ref 3.5–11.3)

## 2023-12-01 ENCOUNTER — OFFICE VISIT (OUTPATIENT)
Dept: FAMILY MEDICINE CLINIC | Age: 73
End: 2023-12-01

## 2023-12-01 VITALS
HEART RATE: 72 BPM | BODY MASS INDEX: 28.98 KG/M2 | SYSTOLIC BLOOD PRESSURE: 130 MMHG | TEMPERATURE: 97.7 F | DIASTOLIC BLOOD PRESSURE: 72 MMHG | WEIGHT: 161 LBS

## 2023-12-01 DIAGNOSIS — E03.9 HYPOTHYROIDISM, UNSPECIFIED TYPE: ICD-10-CM

## 2023-12-01 DIAGNOSIS — E11.9 NEW ONSET TYPE 2 DIABETES MELLITUS (HCC): ICD-10-CM

## 2023-12-01 DIAGNOSIS — R45.89 DEPRESSED MOOD: Primary | ICD-10-CM

## 2023-12-01 DIAGNOSIS — E55.9 VITAMIN D DEFICIENCY: ICD-10-CM

## 2023-12-01 DIAGNOSIS — K21.00 GASTROESOPHAGEAL REFLUX DISEASE WITH ESOPHAGITIS WITHOUT HEMORRHAGE: ICD-10-CM

## 2023-12-01 DIAGNOSIS — N18.31 STAGE 3A CHRONIC KIDNEY DISEASE (HCC): ICD-10-CM

## 2023-12-01 DIAGNOSIS — R19.5 POSITIVE FIT (FECAL IMMUNOCHEMICAL TEST): ICD-10-CM

## 2023-12-01 DIAGNOSIS — E78.5 HYPERLIPIDEMIA, UNSPECIFIED HYPERLIPIDEMIA TYPE: ICD-10-CM

## 2023-12-01 DIAGNOSIS — N18.31 TYPE 2 DIABETES MELLITUS WITH STAGE 3A CHRONIC KIDNEY DISEASE, WITHOUT LONG-TERM CURRENT USE OF INSULIN (HCC): ICD-10-CM

## 2023-12-01 DIAGNOSIS — M81.0 AGE-RELATED OSTEOPOROSIS WITHOUT CURRENT PATHOLOGICAL FRACTURE: ICD-10-CM

## 2023-12-01 DIAGNOSIS — F41.9 ANXIETY: ICD-10-CM

## 2023-12-01 DIAGNOSIS — E11.22 TYPE 2 DIABETES MELLITUS WITH STAGE 3A CHRONIC KIDNEY DISEASE, WITHOUT LONG-TERM CURRENT USE OF INSULIN (HCC): ICD-10-CM

## 2023-12-01 DIAGNOSIS — E66.3 OVERWEIGHT WITH BODY MASS INDEX (BMI) 25.0-29.9: ICD-10-CM

## 2023-12-01 RX ORDER — BLOOD-GLUCOSE METER
1 KIT MISCELLANEOUS DAILY
Qty: 1 KIT | Refills: 0 | Status: SHIPPED | OUTPATIENT
Start: 2023-12-01

## 2023-12-01 RX ORDER — LANCETS 30 GAUGE
1 EACH MISCELLANEOUS DAILY
Qty: 100 EACH | Refills: 5 | Status: SHIPPED | OUTPATIENT
Start: 2023-12-01

## 2023-12-01 RX ORDER — GLUCOSAMINE HCL/CHONDROITIN SU 500-400 MG
CAPSULE ORAL
Qty: 100 STRIP | Refills: 1 | Status: SHIPPED | OUTPATIENT
Start: 2023-12-01

## 2023-12-01 ASSESSMENT — ENCOUNTER SYMPTOMS
TROUBLE SWALLOWING: 0
COLOR CHANGE: 0
COUGH: 0
RHINORRHEA: 0
EYE DISCHARGE: 0
ABDOMINAL DISTENTION: 0
SORE THROAT: 0
EYE PAIN: 0
WHEEZING: 0
CHEST TIGHTNESS: 0
ABDOMINAL PAIN: 0
BLOOD IN STOOL: 0
VOMITING: 0
CONSTIPATION: 0
DIARRHEA: 0
STRIDOR: 0
SINUS PRESSURE: 0
PHOTOPHOBIA: 0
EYE REDNESS: 0
BACK PAIN: 0
NAUSEA: 0

## 2023-12-01 NOTE — PROGRESS NOTES
Krystle Norman (:  1950) is a 75 y.o. female,Established patient, here for evaluation of the following chief complaint(s):    Medication Check         ASSESSMENT/PLAN:    1. Depressed mood  2. Anxiety  3. Gastroesophageal reflux disease with esophagitis without hemorrhage  4. Positive FIT (fecal immunochemical test)  5. New onset type 2 diabetes mellitus (HCC)  -     Hemoglobin A1C; Future  -     Microalbumin / Creatinine Urine Ratio; Future  6. Type 2 diabetes mellitus with stage 3a chronic kidney disease, without long-term current use of insulin ([address])  -     glucose monitoring kit; DAILY Starting 2023, Disp-1 kit, R-0, Print  -     Lancets MISC; DAILY Starting 2023, Disp-100 each, R-5, Print  -     blood glucose monitor strips; Test 2 times a day & as needed for symptoms of irregular blood glucose. Dispense sufficient amount for indicated testing frequency plus additional to accommodate PRN testing needs. , Disp-100 strip, R-1, Print  -     External Referral To Diabetic Education  -     Hemoglobin A1C; Future  -     Microalbumin / Creatinine Urine Ratio; Future  7. Overweight with body mass index (BMI) 25.0-29.9  8. Hypothyroidism, unspecified type  -     TSH; Future  9. Hyperlipidemia, unspecified hyperlipidemia type  -     Lipid Panel; Future  -     Comprehensive Metabolic Panel; Future  -     CBC; Future  10. Stage 3a chronic kidney disease (HCC)  -     Microalbumin / Creatinine Urine Ratio; Future  11. Age-related osteoporosis without current pathological fracture  12. Vitamin D deficiency  -     Vitamin D 25 Hydroxy;  Future        Continue Celexa 10 mg once daily for at least 6 months -reevaluate in April  Consider counseling if symptoms worsen  Continue pantoprazole for 3 months total  Consider GI referral if GERD recurs  Repeat fit test in December  Consider GI referral and colonoscopy if FIT test is once again positive  Referral to diabetes education/nutrition  Order given for

## 2023-12-02 PROBLEM — R45.89 DEPRESSED MOOD: Status: ACTIVE | Noted: 2023-12-02

## 2023-12-02 PROBLEM — K21.00 GASTROESOPHAGEAL REFLUX DISEASE WITH ESOPHAGITIS WITHOUT HEMORRHAGE: Status: ACTIVE | Noted: 2023-12-02

## 2023-12-02 PROBLEM — F41.9 ANXIETY: Status: ACTIVE | Noted: 2023-12-02

## 2023-12-02 ASSESSMENT — ENCOUNTER SYMPTOMS
SHORTNESS OF BREATH: 1
VOICE CHANGE: 1

## 2023-12-05 ENCOUNTER — TELEPHONE (OUTPATIENT)
Dept: FAMILY MEDICINE CLINIC | Age: 73
End: 2023-12-05

## 2023-12-05 DIAGNOSIS — E11.9 NEW ONSET TYPE 2 DIABETES MELLITUS (HCC): Primary | ICD-10-CM

## 2023-12-05 RX ORDER — LANCETS 30 GAUGE
1 EACH MISCELLANEOUS DAILY
Qty: 100 EACH | Refills: 5 | Status: SHIPPED | OUTPATIENT
Start: 2023-12-05

## 2023-12-05 RX ORDER — GLUCOSAMINE HCL/CHONDROITIN SU 500-400 MG
CAPSULE ORAL
Qty: 100 STRIP | Refills: 5 | Status: SHIPPED | OUTPATIENT
Start: 2023-12-05

## 2023-12-05 NOTE — TELEPHONE ENCOUNTER
[employer] Cyndi requesting electronically sent scripts for Blood Glucose monitor, strips and lancets to comply with Medicare part B. Per guideline, scripts must state to test once daily.   Please review, sign if correct, pended scripts Statement Selected

## 2023-12-06 DIAGNOSIS — E11.9 NEW ONSET TYPE 2 DIABETES MELLITUS (HCC): ICD-10-CM

## 2023-12-06 DIAGNOSIS — N18.31 TYPE 2 DIABETES MELLITUS WITH STAGE 3A CHRONIC KIDNEY DISEASE, WITHOUT LONG-TERM CURRENT USE OF INSULIN (HCC): ICD-10-CM

## 2023-12-06 DIAGNOSIS — E11.22 TYPE 2 DIABETES MELLITUS WITH STAGE 3A CHRONIC KIDNEY DISEASE, WITHOUT LONG-TERM CURRENT USE OF INSULIN (HCC): ICD-10-CM

## 2023-12-06 NOTE — TELEPHONE ENCOUNTER
----- Message from Olvin Steward sent at 12/6/2023 10:13 AM EST -----  Subject: Message to Provider    QUESTIONS  Information for Provider? Radha Wheeler with [phone] [facility] called to inform   that prescriptions received for pt's blood sugar supplies will not be   covered by Medicare the way it is written. The Strips states as needed and   should state as once a day only and should never states as needed or else   Medicare will not cover. She also stated the meter does have any   direction. Please resend with updated information.  ---------------------------------------------------------------------------  --------------  Marian LYNN  [phone]; OK to leave message on voicemail  ---------------------------------------------------------------------------  --------------  SCRIPT ANSWERS  Relationship to Patient? Covered Entity  Covered Entity Type? Pharmacy? Representative Name?  Radha Wheeler

## 2023-12-07 RX ORDER — GLUCOSAMINE HCL/CHONDROITIN SU 500-400 MG
CAPSULE ORAL
Qty: 100 STRIP | Refills: 1 | Status: CANCELLED | OUTPATIENT
Start: 2023-12-07

## 2023-12-08 NOTE — TELEPHONE ENCOUNTER
Well the strips do say once daily AND as needed. I sent the first prescription as test twice daily and as needed. Someone called yesterday and said it had to say once daily so it was changed to once daily AND as needed. I have never heard of Medicare requiring this before but I am happy to take the as needed out - but this won't cover for any extra strips then in case the patient does need to check more than once daily or twice daily. So I guess I will write for it twice daily so she gets more. Will Medicare cover for twice daily or they telling me how to instruct my patient to check their blood sugar and ONLY once daily is covered? Please clarify.

## 2023-12-11 NOTE — TELEPHONE ENCOUNTER
Pharmacy needs a new script for the test strips that doesn't have PRN in the instructions for Medicare.  Changes pending approval

## 2023-12-12 RX ORDER — GLUCOSAMINE HCL/CHONDROITIN SU 500-400 MG
1 CAPSULE ORAL 2 TIMES DAILY
Qty: 100 STRIP | Refills: 5 | Status: SHIPPED | OUTPATIENT
Start: 2023-12-12 | End: 2023-12-15 | Stop reason: CLARIF

## 2023-12-12 NOTE — TELEPHONE ENCOUNTER
Test 1 times a day . Dispense sufficient amount for indicated testing frequency plus additional to accommodate PRN testing needs. I changed to twice daily - NO PRN    Above instructions were removed from the prescription - if we cannot write for prn strips then we cannot ask for prn testing needs.

## 2023-12-14 NOTE — TELEPHONE ENCOUNTER
Patient stopped into the office stating Medicare will ONLY cover testing ONCE daily, no PRN. Essentially, the office note will need to correlate with this. Patient will pay out of pocket for strips and lancets if necessary. She simply is asking for the initial monitor to be covered under 2023 insurance.  amb

## 2023-12-16 RX ORDER — GLUCOSAMINE HCL/CHONDROITIN SU 500-400 MG
CAPSULE ORAL
Qty: 100 STRIP | Refills: 3 | Status: SHIPPED | OUTPATIENT
Start: 2023-12-16

## 2023-12-27 DIAGNOSIS — E03.9 HYPOTHYROIDISM, UNSPECIFIED TYPE: ICD-10-CM

## 2023-12-27 DIAGNOSIS — E78.5 HYPERLIPIDEMIA, UNSPECIFIED HYPERLIPIDEMIA TYPE: ICD-10-CM

## 2023-12-27 RX ORDER — LEVOTHYROXINE SODIUM 88 UG/1
TABLET ORAL
Qty: 90 TABLET | Refills: 0 | Status: SHIPPED | OUTPATIENT
Start: 2023-12-27

## 2023-12-27 RX ORDER — SIMVASTATIN 40 MG
40 TABLET ORAL NIGHTLY
Qty: 90 TABLET | Refills: 0 | Status: SHIPPED | OUTPATIENT
Start: 2023-12-27 | End: 2024-12-26

## 2023-12-27 NOTE — TELEPHONE ENCOUNTER
LOV 12/1/23   RTO 4/26/24  LRF 7/7/2023          Controlled Substance Monitoring:    Acute and Chronic Pain Monitoring:        No data to display

## 2024-01-20 DIAGNOSIS — K21.00 GASTROESOPHAGEAL REFLUX DISEASE WITH ESOPHAGITIS WITHOUT HEMORRHAGE: ICD-10-CM

## 2024-01-22 NOTE — TELEPHONE ENCOUNTER
LOV 12-1-23   RTO 4-26-24  LRF 10-26-23          Controlled Substance Monitoring:    Acute and Chronic Pain Monitoring:        No data to display

## 2024-01-23 RX ORDER — PANTOPRAZOLE SODIUM 40 MG/1
40 TABLET, DELAYED RELEASE ORAL
Qty: 30 TABLET | Refills: 5 | Status: SHIPPED | OUTPATIENT
Start: 2024-01-23 | End: 2025-01-23

## 2024-03-25 DIAGNOSIS — E78.5 HYPERLIPIDEMIA, UNSPECIFIED HYPERLIPIDEMIA TYPE: ICD-10-CM

## 2024-03-25 DIAGNOSIS — E03.9 HYPOTHYROIDISM, UNSPECIFIED TYPE: ICD-10-CM

## 2024-04-02 RX ORDER — SIMVASTATIN 40 MG
40 TABLET ORAL NIGHTLY
Qty: 90 TABLET | Refills: 3 | Status: SHIPPED | OUTPATIENT
Start: 2024-04-02 | End: 2025-04-02

## 2024-04-02 RX ORDER — LEVOTHYROXINE SODIUM 88 UG/1
88 TABLET ORAL DAILY
Qty: 90 TABLET | Refills: 3 | Status: SHIPPED | OUTPATIENT
Start: 2024-04-02

## 2024-04-02 NOTE — TELEPHONE ENCOUNTER
LOV 12/2/2023   RTO 4 months (scheduled 4/26/2024)  LRF 12/27/2023           Controlled Substance Monitoring:    Acute and Chronic Pain Monitoring:        No data to display

## 2024-04-16 DIAGNOSIS — R45.89 DEPRESSED MOOD: ICD-10-CM

## 2024-04-16 DIAGNOSIS — F41.9 ANXIETY: ICD-10-CM

## 2024-04-16 RX ORDER — CITALOPRAM HYDROBROMIDE 10 MG/1
10 TABLET ORAL DAILY
Qty: 30 TABLET | Refills: 5 | Status: SHIPPED | OUTPATIENT
Start: 2024-04-16

## 2024-04-16 NOTE — TELEPHONE ENCOUNTER
LOV 12/1/23   RTO 4/26/24  LRF 10/26/23          Controlled Substance Monitoring:    Acute and Chronic Pain Monitoring:        No data to display

## 2024-04-23 ASSESSMENT — PATIENT HEALTH QUESTIONNAIRE - PHQ9
SUM OF ALL RESPONSES TO PHQ QUESTIONS 1-9: 0
SUM OF ALL RESPONSES TO PHQ QUESTIONS 1-9: 0
SUM OF ALL RESPONSES TO PHQ9 QUESTIONS 1 & 2: 0
SUM OF ALL RESPONSES TO PHQ QUESTIONS 1-9: 0
1. LITTLE INTEREST OR PLEASURE IN DOING THINGS: NOT AT ALL
SUM OF ALL RESPONSES TO PHQ QUESTIONS 1-9: 0
SUM OF ALL RESPONSES TO PHQ9 QUESTIONS 1 & 2: 0
1. LITTLE INTEREST OR PLEASURE IN DOING THINGS: NOT AT ALL
2. FEELING DOWN, DEPRESSED OR HOPELESS: NOT AT ALL
2. FEELING DOWN, DEPRESSED OR HOPELESS: NOT AT ALL

## 2024-04-26 ENCOUNTER — OFFICE VISIT (OUTPATIENT)
Dept: FAMILY MEDICINE CLINIC | Age: 74
End: 2024-04-26

## 2024-04-26 ENCOUNTER — TELEPHONE (OUTPATIENT)
Dept: ONCOLOGY | Age: 74
End: 2024-04-26

## 2024-04-26 VITALS
DIASTOLIC BLOOD PRESSURE: 72 MMHG | BODY MASS INDEX: 28.42 KG/M2 | SYSTOLIC BLOOD PRESSURE: 128 MMHG | HEIGHT: 63 IN | TEMPERATURE: 97.9 F | HEART RATE: 69 BPM | OXYGEN SATURATION: 94 % | WEIGHT: 160.4 LBS

## 2024-04-26 DIAGNOSIS — R45.89 DEPRESSED MOOD: ICD-10-CM

## 2024-04-26 DIAGNOSIS — R91.1 NODULE OF LEFT LUNG: ICD-10-CM

## 2024-04-26 DIAGNOSIS — I51.89 DIASTOLIC DYSFUNCTION: ICD-10-CM

## 2024-04-26 DIAGNOSIS — J44.9 COPD, MILD (HCC): ICD-10-CM

## 2024-04-26 DIAGNOSIS — Z87.891 PERSONAL HISTORY OF NICOTINE DEPENDENCE: Primary | ICD-10-CM

## 2024-04-26 DIAGNOSIS — R19.5 POSITIVE FIT (FECAL IMMUNOCHEMICAL TEST): ICD-10-CM

## 2024-04-26 DIAGNOSIS — E11.22 TYPE 2 DIABETES MELLITUS WITH STAGE 3A CHRONIC KIDNEY DISEASE, WITHOUT LONG-TERM CURRENT USE OF INSULIN (HCC): ICD-10-CM

## 2024-04-26 DIAGNOSIS — I25.10 MILD CORONARY ARTERY DISEASE: ICD-10-CM

## 2024-04-26 DIAGNOSIS — K76.0 FATTY LIVER: ICD-10-CM

## 2024-04-26 DIAGNOSIS — I25.10 CORONARY ARTERY CALCIFICATION SEEN ON CT SCAN: ICD-10-CM

## 2024-04-26 DIAGNOSIS — R31.29 MICROHEMATURIA: ICD-10-CM

## 2024-04-26 DIAGNOSIS — R93.1 ELEVATED CORONARY ARTERY CALCIUM SCORE: ICD-10-CM

## 2024-04-26 DIAGNOSIS — Z90.12 HISTORY OF LEFT MASTECTOMY: ICD-10-CM

## 2024-04-26 DIAGNOSIS — Z87.891 PERSONAL HISTORY OF SMOKING: ICD-10-CM

## 2024-04-26 DIAGNOSIS — N20.0 KIDNEY STONES: ICD-10-CM

## 2024-04-26 DIAGNOSIS — K21.00 GASTROESOPHAGEAL REFLUX DISEASE WITH ESOPHAGITIS WITHOUT HEMORRHAGE: ICD-10-CM

## 2024-04-26 DIAGNOSIS — Z12.31 SCREENING MAMMOGRAM FOR BREAST CANCER: ICD-10-CM

## 2024-04-26 DIAGNOSIS — E55.9 VITAMIN D DEFICIENCY: ICD-10-CM

## 2024-04-26 DIAGNOSIS — Z85.3 HISTORY OF BREAST CANCER IN FEMALE: ICD-10-CM

## 2024-04-26 DIAGNOSIS — E66.3 OVERWEIGHT WITH BODY MASS INDEX (BMI) 25.0-29.9: ICD-10-CM

## 2024-04-26 DIAGNOSIS — M81.0 AGE-RELATED OSTEOPOROSIS WITHOUT CURRENT PATHOLOGICAL FRACTURE: ICD-10-CM

## 2024-04-26 DIAGNOSIS — E03.9 HYPOTHYROIDISM, UNSPECIFIED TYPE: Primary | ICD-10-CM

## 2024-04-26 DIAGNOSIS — K80.20 CALCULUS OF GALLBLADDER WITHOUT CHOLECYSTITIS WITHOUT OBSTRUCTION: ICD-10-CM

## 2024-04-26 DIAGNOSIS — R06.02 EXERTIONAL SHORTNESS OF BREATH: ICD-10-CM

## 2024-04-26 DIAGNOSIS — E78.5 HYPERLIPIDEMIA, UNSPECIFIED HYPERLIPIDEMIA TYPE: ICD-10-CM

## 2024-04-26 DIAGNOSIS — R01.1 HEART MURMUR: ICD-10-CM

## 2024-04-26 DIAGNOSIS — N18.31 TYPE 2 DIABETES MELLITUS WITH STAGE 3A CHRONIC KIDNEY DISEASE, WITHOUT LONG-TERM CURRENT USE OF INSULIN (HCC): ICD-10-CM

## 2024-04-26 DIAGNOSIS — F41.9 ANXIETY: ICD-10-CM

## 2024-04-26 DIAGNOSIS — I34.0 MILD MITRAL REGURGITATION BY PRIOR ECHOCARDIOGRAM: ICD-10-CM

## 2024-04-26 DIAGNOSIS — N18.31 STAGE 3A CHRONIC KIDNEY DISEASE (HCC): ICD-10-CM

## 2024-04-26 ASSESSMENT — ENCOUNTER SYMPTOMS
COUGH: 0
COLOR CHANGE: 0
RHINORRHEA: 0
ABDOMINAL PAIN: 0
WHEEZING: 0
ABDOMINAL DISTENTION: 0
NAUSEA: 0
SORE THROAT: 0
STRIDOR: 0
VOICE CHANGE: 1
TROUBLE SWALLOWING: 0
SINUS PRESSURE: 0
SHORTNESS OF BREATH: 1
BLOOD IN STOOL: 0
EYE PAIN: 0
EYE DISCHARGE: 0
CHEST TIGHTNESS: 0
VOMITING: 0
BACK PAIN: 0
DIARRHEA: 0
CONSTIPATION: 0
PHOTOPHOBIA: 0

## 2024-04-26 NOTE — TELEPHONE ENCOUNTER
Our records indicate that your patient is coming due for their annual lung cancer screening follow up testing.     For your convenience, we have pended the order for the scan for you. If you do not agree with the need for the test, please cancel the order and let us know.     Sincerely,    [facility]   Lung Cancer Screening Program    Auto printed reminder letter sent to patient.

## 2024-04-26 NOTE — PROGRESS NOTES
Dez Vail (:  1950) is a 73 y.o. female,Established patient, here for evaluation of the following chief complaint(s):    Diabetes      Assessment & Plan     1. Hypothyroidism, unspecified type  2. Hyperlipidemia, unspecified hyperlipidemia type  3. Type 2 diabetes mellitus with stage 3a chronic kidney disease, without long-term current use of insulin ([facility])  -      DIABETES FOOT EXAM  4. Overweight with body mass index (BMI) 25.0-29.9  5. Stage 3a chronic kidney disease (HCC)  6. Microhematuria  7. Kidney stones  8. Age-related osteoporosis without current pathological fracture  9. Vitamin D deficiency  10. Depressed mood  11. Anxiety  12. Personal history of smoking  13. Nodule of left lung  14. COPD, mild ([facility])  15. Exertional shortness of breath  16. Coronary artery calcification seen on CT scan  17. Elevated coronary artery calcium score  18. Mild coronary artery disease  19. Heart murmur  20. Diastolic dysfunction  21. Mild mitral regurgitation by prior echocardiogram  22. Fatty liver  23. Calculus of gallbladder without cholecystitis without obstruction  24. Gastroesophageal reflux disease with esophagitis without hemorrhage  25. Positive FIT (fecal immunochemical test)  26. History of breast cancer in female  -     AJIT DIGITAL SCREEN UNILATERAL RIGHT; Future  27. History of left mastectomy  -     AJIT DIGITAL SCREEN UNILATERAL RIGHT; Future  28. Screening mammogram for breast cancer  -     [facility] DIGITAL SCREEN UNILATERAL RIGHT; Future        Obtain labs as ordered  Continue same medications  Strict low-cholesterol, low carbohydrate diet and regular exercise encouraged  Weight reduction encouraged   Continue to monitor kidney function closely over time  Avoid nephrotoxic agents including NSAIDs  Consider nephrology referral if kidney function worsens over time  Weightbearing exercise recommended  Continue calcium and vitamin D supplementation  Wean Celexa to every other day for 2 to 3 weeks

## 2024-04-30 ENCOUNTER — HOSPITAL ENCOUNTER (OUTPATIENT)
Age: 74
Setting detail: SPECIMEN
Discharge: HOME | End: 2024-04-30

## 2024-04-30 DIAGNOSIS — E11.9 NEW ONSET TYPE 2 DIABETES MELLITUS (HCC): ICD-10-CM

## 2024-04-30 DIAGNOSIS — E03.9 HYPOTHYROIDISM, UNSPECIFIED TYPE: ICD-10-CM

## 2024-04-30 DIAGNOSIS — E11.22 TYPE 2 DIABETES MELLITUS WITH STAGE 3A CHRONIC KIDNEY DISEASE, WITHOUT LONG-TERM CURRENT USE OF INSULIN (HCC): ICD-10-CM

## 2024-04-30 DIAGNOSIS — E78.5 HYPERLIPIDEMIA, UNSPECIFIED HYPERLIPIDEMIA TYPE: ICD-10-CM

## 2024-04-30 DIAGNOSIS — N18.31 TYPE 2 DIABETES MELLITUS WITH STAGE 3A CHRONIC KIDNEY DISEASE, WITHOUT LONG-TERM CURRENT USE OF INSULIN (HCC): ICD-10-CM

## 2024-04-30 DIAGNOSIS — E55.9 VITAMIN D DEFICIENCY: ICD-10-CM

## 2024-04-30 DIAGNOSIS — N18.31 STAGE 3A CHRONIC KIDNEY DISEASE (HCC): ICD-10-CM

## 2024-04-30 LAB
25(OH)D3 SERPL-MCNC: 50.8 NG/ML (ref 30–100)
ALBUMIN SERPL-MCNC: 4.3 G/DL (ref 3.5–5.2)
ALBUMIN/GLOB SERPL: 2 {RATIO} (ref 1–2.5)
ALP SERPL-CCNC: 91 U/L (ref 35–104)
ALT SERPL-CCNC: 17 U/L (ref 10–35)
ANION GAP SERPL CALCULATED.3IONS-SCNC: 13 MMOL/L (ref 9–16)
AST SERPL-CCNC: 26 U/L (ref 10–35)
BILIRUB SERPL-MCNC: 0.5 MG/DL (ref 0–1.2)
BUN SERPL-MCNC: 14 MG/DL (ref 8–23)
CALCIUM SERPL-MCNC: 9.2 MG/DL (ref 8.6–10.4)
CHLORIDE SERPL-SCNC: 103 MMOL/L (ref 98–107)
CHOLEST SERPL-MCNC: 150 MG/DL (ref 0–199)
CHOLESTEROL/HDL RATIO: 4
CO2 SERPL-SCNC: 26 MMOL/L (ref 20–31)
CONTROL: YES
CREAT SERPL-MCNC: 1.1 MG/DL (ref 0.5–0.9)
CREAT UR-MCNC: 49.9 MG/DL (ref 28–217)
ERYTHROCYTE [DISTWIDTH] IN BLOOD BY AUTOMATED COUNT: 13.2 % (ref 11.8–14.4)
EST. AVERAGE GLUCOSE BLD GHB EST-MCNC: 123 MG/DL
FECAL BLOOD IMMUNOCHEMICAL TEST: NORMAL
GFR SERPL CREATININE-BSD FRML MDRD: 51 ML/MIN/1.73M2
GLUCOSE SERPL-MCNC: 92 MG/DL (ref 74–99)
HBA1C MFR BLD: 5.9 % (ref 4–6)
HCT VFR BLD AUTO: 41.3 % (ref 36.3–47.1)
HDLC SERPL-MCNC: 39 MG/DL
HGB BLD-MCNC: 13.4 G/DL (ref 11.9–15.1)
LDLC SERPL CALC-MCNC: 84 MG/DL (ref 0–100)
MCH RBC QN AUTO: 31.7 PG (ref 25.2–33.5)
MCHC RBC AUTO-ENTMCNC: 32.4 G/DL (ref 28.4–34.8)
MCV RBC AUTO: 97.6 FL (ref 82.6–102.9)
MICROALBUMIN UR-MCNC: <12 MG/L (ref 0–20)
MICROALBUMIN/CREAT UR-RTO: NORMAL MCG/MG CREAT (ref 0–25)
NRBC BLD-RTO: 0 PER 100 WBC
PLATELET # BLD AUTO: 177 K/UL (ref 138–453)
PMV BLD AUTO: 11.3 FL (ref 8.1–13.5)
POTASSIUM SERPL-SCNC: 4.2 MMOL/L (ref 3.7–5.3)
PROT SERPL-MCNC: 6.9 G/DL (ref 6.6–8.7)
RBC # BLD AUTO: 4.23 M/UL (ref 3.95–5.11)
SODIUM SERPL-SCNC: 142 MMOL/L (ref 136–145)
TRIGL SERPL-MCNC: 138 MG/DL
TSH SERPL DL<=0.05 MIU/L-ACNC: 2.87 UIU/ML (ref 0.27–4.2)
VLDLC SERPL CALC-MCNC: 28 MG/DL
WBC OTHER # BLD: 6.5 K/UL (ref 3.5–11.3)

## 2024-06-04 ENCOUNTER — HOSPITAL ENCOUNTER (OUTPATIENT)
Dept: CT IMAGING | Age: 74
Discharge: HOME | End: 2024-06-06
Payer: MEDICARE

## 2024-06-04 ENCOUNTER — HOSPITAL ENCOUNTER (OUTPATIENT)
Dept: MAMMOGRAPHY | Age: 74
Discharge: HOME | End: 2024-06-06
Payer: MEDICARE

## 2024-06-04 VITALS — WEIGHT: 160 LBS | HEIGHT: 62 IN | BODY MASS INDEX: 29.44 KG/M2

## 2024-06-04 DIAGNOSIS — Z90.12 HISTORY OF LEFT MASTECTOMY: ICD-10-CM

## 2024-06-04 DIAGNOSIS — Z87.891 PERSONAL HISTORY OF NICOTINE DEPENDENCE: ICD-10-CM

## 2024-06-04 DIAGNOSIS — Z85.3 HISTORY OF BREAST CANCER IN FEMALE: ICD-10-CM

## 2024-06-04 DIAGNOSIS — Z12.31 SCREENING MAMMOGRAM FOR BREAST CANCER: ICD-10-CM

## 2024-06-04 PROCEDURE — 71271 CT THORAX LUNG CANCER SCR C-: CPT

## 2024-06-04 PROCEDURE — 77063 BREAST TOMOSYNTHESIS BI: CPT

## 2024-06-07 DIAGNOSIS — Z87.891 FORMER SMOKER: ICD-10-CM

## 2024-06-07 DIAGNOSIS — Z12.31 SCREENING MAMMOGRAM FOR BREAST CANCER: ICD-10-CM

## 2024-06-07 DIAGNOSIS — Z90.12 HISTORY OF LEFT MASTECTOMY: Primary | ICD-10-CM

## 2024-06-28 ENCOUNTER — HOSPITAL ENCOUNTER (OUTPATIENT)
Age: 74
Setting detail: SPECIMEN
Discharge: HOME | End: 2024-06-28

## 2024-06-28 LAB
CREAT SERPL-MCNC: 1.2 MG/DL (ref 0.5–0.9)
GFR, ESTIMATED: 48 ML/MIN/1.73M2

## 2024-07-14 DIAGNOSIS — K21.00 GASTROESOPHAGEAL REFLUX DISEASE WITH ESOPHAGITIS WITHOUT HEMORRHAGE: ICD-10-CM

## 2024-07-15 NOTE — TELEPHONE ENCOUNTER
LOV 4-26-24   RTO 10-28-24  LRF 1-23-24          Controlled Substance Monitoring:    Acute and Chronic Pain Monitoring:        No data to display

## 2024-07-16 RX ORDER — PANTOPRAZOLE SODIUM 40 MG/1
40 TABLET, DELAYED RELEASE ORAL
Qty: 30 TABLET | Refills: 5 | Status: SHIPPED | OUTPATIENT
Start: 2024-07-16 | End: 2025-07-16

## 2024-10-12 DIAGNOSIS — R45.89 DEPRESSED MOOD: ICD-10-CM

## 2024-10-12 DIAGNOSIS — F41.9 ANXIETY: ICD-10-CM

## 2024-10-14 NOTE — TELEPHONE ENCOUNTER
LOV 4/26/24   RTO 10/28/24  LRF 4/16/24          Controlled Substance Monitoring:    Acute and Chronic Pain Monitoring:        No data to display

## 2024-10-15 RX ORDER — CITALOPRAM HYDROBROMIDE 10 MG/1
10 TABLET ORAL DAILY
Qty: 90 TABLET | Refills: 1 | Status: SHIPPED | OUTPATIENT
Start: 2024-10-15

## 2024-10-25 SDOH — ECONOMIC STABILITY: FOOD INSECURITY: WITHIN THE PAST 12 MONTHS, YOU WORRIED THAT YOUR FOOD WOULD RUN OUT BEFORE YOU GOT MONEY TO BUY MORE.: NEVER TRUE

## 2024-10-25 SDOH — ECONOMIC STABILITY: FOOD INSECURITY: WITHIN THE PAST 12 MONTHS, THE FOOD YOU BOUGHT JUST DIDN'T LAST AND YOU DIDN'T HAVE MONEY TO GET MORE.: NEVER TRUE

## 2024-10-25 SDOH — HEALTH STABILITY: PHYSICAL HEALTH: ON AVERAGE, HOW MANY DAYS PER WEEK DO YOU ENGAGE IN MODERATE TO STRENUOUS EXERCISE (LIKE A BRISK WALK)?: 1 DAY

## 2024-10-25 SDOH — HEALTH STABILITY: PHYSICAL HEALTH: ON AVERAGE, HOW MANY MINUTES DO YOU ENGAGE IN EXERCISE AT THIS LEVEL?: 20 MIN

## 2024-10-25 SDOH — ECONOMIC STABILITY: INCOME INSECURITY: HOW HARD IS IT FOR YOU TO PAY FOR THE VERY BASICS LIKE FOOD, HOUSING, MEDICAL CARE, AND HEATING?: NOT HARD AT ALL

## 2024-10-25 ASSESSMENT — PATIENT HEALTH QUESTIONNAIRE - PHQ9
SUM OF ALL RESPONSES TO PHQ9 QUESTIONS 1 & 2: 0
SUM OF ALL RESPONSES TO PHQ QUESTIONS 1-9: 0
SUM OF ALL RESPONSES TO PHQ QUESTIONS 1-9: 0
1. LITTLE INTEREST OR PLEASURE IN DOING THINGS: NOT AT ALL
2. FEELING DOWN, DEPRESSED OR HOPELESS: NOT AT ALL
SUM OF ALL RESPONSES TO PHQ QUESTIONS 1-9: 0
SUM OF ALL RESPONSES TO PHQ QUESTIONS 1-9: 0

## 2024-10-25 ASSESSMENT — LIFESTYLE VARIABLES
HOW MANY STANDARD DRINKS CONTAINING ALCOHOL DO YOU HAVE ON A TYPICAL DAY: PATIENT DOES NOT DRINK
HOW OFTEN DO YOU HAVE A DRINK CONTAINING ALCOHOL: NEVER
HOW OFTEN DO YOU HAVE A DRINK CONTAINING ALCOHOL: 1
HOW OFTEN DO YOU HAVE SIX OR MORE DRINKS ON ONE OCCASION: 1
HOW MANY STANDARD DRINKS CONTAINING ALCOHOL DO YOU HAVE ON A TYPICAL DAY: 0

## 2024-10-28 ENCOUNTER — HOSPITAL ENCOUNTER (OUTPATIENT)
Age: 74
Setting detail: SPECIMEN
Discharge: HOME | End: 2024-10-28

## 2024-10-28 ENCOUNTER — OFFICE VISIT (OUTPATIENT)
Dept: FAMILY MEDICINE CLINIC | Age: 74
End: 2024-10-28

## 2024-10-28 VITALS
OXYGEN SATURATION: 96 % | DIASTOLIC BLOOD PRESSURE: 76 MMHG | TEMPERATURE: 97.9 F | BODY MASS INDEX: 29 KG/M2 | SYSTOLIC BLOOD PRESSURE: 126 MMHG | HEIGHT: 62 IN | WEIGHT: 157.6 LBS

## 2024-10-28 DIAGNOSIS — N18.31 TYPE 2 DIABETES MELLITUS WITH STAGE 3A CHRONIC KIDNEY DISEASE, WITHOUT LONG-TERM CURRENT USE OF INSULIN (HCC): ICD-10-CM

## 2024-10-28 DIAGNOSIS — I34.0 MILD MITRAL REGURGITATION BY PRIOR ECHOCARDIOGRAM: ICD-10-CM

## 2024-10-28 DIAGNOSIS — R45.89 DEPRESSED MOOD: ICD-10-CM

## 2024-10-28 DIAGNOSIS — Z90.12 HISTORY OF LEFT MASTECTOMY: ICD-10-CM

## 2024-10-28 DIAGNOSIS — M81.0 AGE-RELATED OSTEOPOROSIS WITHOUT CURRENT PATHOLOGICAL FRACTURE: ICD-10-CM

## 2024-10-28 DIAGNOSIS — Z87.891 PERSONAL HISTORY OF SMOKING: ICD-10-CM

## 2024-10-28 DIAGNOSIS — N20.0 KIDNEY STONES: ICD-10-CM

## 2024-10-28 DIAGNOSIS — K21.00 GASTROESOPHAGEAL REFLUX DISEASE WITH ESOPHAGITIS WITHOUT HEMORRHAGE: ICD-10-CM

## 2024-10-28 DIAGNOSIS — I25.10 MILD CORONARY ARTERY DISEASE: ICD-10-CM

## 2024-10-28 DIAGNOSIS — Z85.3 HISTORY OF BREAST CANCER IN FEMALE: ICD-10-CM

## 2024-10-28 DIAGNOSIS — E11.22 TYPE 2 DIABETES MELLITUS WITH STAGE 3A CHRONIC KIDNEY DISEASE, WITHOUT LONG-TERM CURRENT USE OF INSULIN (HCC): ICD-10-CM

## 2024-10-28 DIAGNOSIS — R91.1 NODULE OF LEFT LUNG: ICD-10-CM

## 2024-10-28 DIAGNOSIS — E55.9 VITAMIN D DEFICIENCY: ICD-10-CM

## 2024-10-28 DIAGNOSIS — E03.9 HYPOTHYROIDISM, UNSPECIFIED TYPE: ICD-10-CM

## 2024-10-28 DIAGNOSIS — E78.5 HYPERLIPIDEMIA, UNSPECIFIED HYPERLIPIDEMIA TYPE: ICD-10-CM

## 2024-10-28 DIAGNOSIS — F41.9 ANXIETY: ICD-10-CM

## 2024-10-28 DIAGNOSIS — N18.31 STAGE 3A CHRONIC KIDNEY DISEASE (HCC): ICD-10-CM

## 2024-10-28 DIAGNOSIS — K76.0 FATTY LIVER: ICD-10-CM

## 2024-10-28 DIAGNOSIS — R31.29 MICROHEMATURIA: ICD-10-CM

## 2024-10-28 DIAGNOSIS — J44.9 COPD, MILD (HCC): ICD-10-CM

## 2024-10-28 DIAGNOSIS — E66.3 OVERWEIGHT WITH BODY MASS INDEX (BMI) 25.0-29.9: ICD-10-CM

## 2024-10-28 DIAGNOSIS — Z23 FLU VACCINE NEED: ICD-10-CM

## 2024-10-28 DIAGNOSIS — R01.1 HEART MURMUR: ICD-10-CM

## 2024-10-28 DIAGNOSIS — K80.20 CALCULUS OF GALLBLADDER WITHOUT CHOLECYSTITIS WITHOUT OBSTRUCTION: ICD-10-CM

## 2024-10-28 DIAGNOSIS — Z00.00 MEDICARE ANNUAL WELLNESS VISIT, SUBSEQUENT: Primary | ICD-10-CM

## 2024-10-28 DIAGNOSIS — I51.89 DIASTOLIC DYSFUNCTION: ICD-10-CM

## 2024-10-28 LAB
25(OH)D3 SERPL-MCNC: 55.3 NG/ML (ref 30–100)
ALBUMIN SERPL-MCNC: 4.5 G/DL (ref 3.5–5.2)
ALBUMIN/GLOB SERPL: 2 {RATIO} (ref 1–2.5)
ALP SERPL-CCNC: 99 U/L (ref 35–104)
ALT SERPL-CCNC: 16 U/L (ref 10–35)
ANION GAP SERPL CALCULATED.3IONS-SCNC: 14 MMOL/L (ref 9–16)
AST SERPL-CCNC: 23 U/L (ref 10–35)
BILIRUB SERPL-MCNC: 0.7 MG/DL (ref 0–1.2)
BUN SERPL-MCNC: 16 MG/DL (ref 8–23)
CALCIUM SERPL-MCNC: 9.6 MG/DL (ref 8.6–10.4)
CHLORIDE SERPL-SCNC: 101 MMOL/L (ref 98–107)
CO2 SERPL-SCNC: 26 MMOL/L (ref 20–31)
CREAT SERPL-MCNC: 1.2 MG/DL (ref 0.5–0.9)
EST. AVERAGE GLUCOSE BLD GHB EST-MCNC: 128 MG/DL
GFR, ESTIMATED: 46 ML/MIN/1.73M2
GLUCOSE SERPL-MCNC: 97 MG/DL (ref 74–99)
HBA1C MFR BLD: 6.1 % (ref 4–6)
POTASSIUM SERPL-SCNC: 4.2 MMOL/L (ref 3.7–5.3)
PROT SERPL-MCNC: 7.4 G/DL (ref 6.6–8.7)
SODIUM SERPL-SCNC: 141 MMOL/L (ref 136–145)
TSH SERPL DL<=0.05 MIU/L-ACNC: 2.37 UIU/ML (ref 0.27–4.2)

## 2024-10-28 SDOH — ECONOMIC STABILITY: FOOD INSECURITY: WITHIN THE PAST 12 MONTHS, THE FOOD YOU BOUGHT JUST DIDN'T LAST AND YOU DIDN'T HAVE MONEY TO GET MORE.: NEVER TRUE

## 2024-10-28 SDOH — ECONOMIC STABILITY: FOOD INSECURITY: WITHIN THE PAST 12 MONTHS, YOU WORRIED THAT YOUR FOOD WOULD RUN OUT BEFORE YOU GOT MONEY TO BUY MORE.: NEVER TRUE

## 2024-10-28 SDOH — ECONOMIC STABILITY: INCOME INSECURITY: HOW HARD IS IT FOR YOU TO PAY FOR THE VERY BASICS LIKE FOOD, HOUSING, MEDICAL CARE, AND HEATING?: NOT HARD AT ALL

## 2024-10-28 ASSESSMENT — ENCOUNTER SYMPTOMS
SINUS PRESSURE: 0
CONSTIPATION: 0
DIARRHEA: 0
COUGH: 0
ABDOMINAL PAIN: 0
EYE DISCHARGE: 0
SORE THROAT: 0
COLOR CHANGE: 0
NAUSEA: 0
VOICE CHANGE: 0
WHEEZING: 0
BACK PAIN: 0
RHINORRHEA: 0
VOMITING: 0
EYE REDNESS: 0
PHOTOPHOBIA: 0
TROUBLE SWALLOWING: 0
STRIDOR: 0
ABDOMINAL DISTENTION: 0
EYE PAIN: 0
SHORTNESS OF BREATH: 1
BLOOD IN STOOL: 0
CHEST TIGHTNESS: 0

## 2024-10-28 NOTE — PROGRESS NOTES
Medicare Annual Wellness Visit    Dez Vail is here for Medicare AWV    Assessment & Plan     Medicare annual wellness visit, subsequent    Recommendations for Preventive Services Due: see orders and patient instructions/AVS.    Recommended screening schedule for the next 5-10 years is provided to the patient in written form: see Patient Instructions/AVS.     Return in 1 year (on 10/28/2025) for Medicare AWV.         Subjective         Patient's complete Health Risk Assessment and screening values have been reviewed and are found in Flowsheets. The following problems were reviewed today and where indicated follow up appointments were made and/or referrals ordered.    Positive Risk Factor Screenings with Interventions:                Inactivity:  On average, how many days per week do you engage in moderate to strenuous exercise (like a brisk walk)?: 1 day (!) Abnormal  On average, how many minutes do you engage in exercise at this level?: 20 min  Interventions:  Patient encouraged to exercise 5x/weekly          Safety:  Do you have non-slip mats or non-slip surfaces or shower bars or grab bars in your shower or bathtub?: (!) No  Interventions:  Safety tips discussed                   Objective   Vitals:    10/28/24 0911 10/28/24 1005   BP: (!) 158/70 126/76   Site: Right Upper Arm Right Upper Arm   Position: Sitting Sitting   Cuff Size: Medium Adult Medium Adult   Temp: 97.9 °F (36.6 °C)    TempSrc: Infrared    SpO2: 96%    Weight: 71.5 kg (157 lb 9.6 oz)    Height: 1.581 m (5' 2.25\")       Body mass index is 28.59 kg/m².                  Allergies   Allergen Reactions    Bactrim [Sulfamethoxazole-Trimethoprim] Hives    Tetanus Toxoids Itching and Swelling     Prior to Visit Medications    Medication Sig Taking? Authorizing Provider   citalopram (CELEXA) 10 MG tablet Take 1 tablet by mouth once daily  Kari Padgett MD   pantoprazole (PROTONIX) 40 MG tablet Take 1 tablet by mouth every morning 
the past but she was referred to cardiology after obtaining the abnormal calcium scoring screen.  She has subsequently had further testing done with them including a nuclear stress test, repeat echocardiogram and Holter monitor.  The EKG portion of her stress test was abnormal but the nuclear portion was normal.  Her echocardiogram was similar to previous and her Holter monitor did show some abnormalities but nothing that cardiology was concerned with.  They did suspect that her shortness of breath was an anginal equivalent so they did try her on Ranexa for 1 week.  She did not notice any improvement in her symptoms at all while taking this medication so she discontinued it.  She did follow-up with cardiology in July and did have a normal heart screen test.  She will have one more echocardiogram in January 2025.      She has a fatty liver and small gallstone were also found on CT scan as well.  She was diagnosed with GERD at her last visit and she was placed on pantoprazole.  This has helped.  She has still had some occasional heartburn / regurgitation. We also discussed possibly following up with GI for an EGD if her symptoms persist.  She is against this right now.  She did have a positive FIT test in the past but adamantly refused colonoscopy and elected to repeat a fit test.  This repeat fit test was done in April 2024 and was negative.  I did recommend repeating a FIT test in April of 2025.     She does have a history of cancer of right breast that was diagnosed in 2002 and this was treated with mastectomy, chemotherapy, radiation and endocrine therapy.  She took 4 years of tamoxifen and 2 years of an aromatase inhibitor.  She did see Dr. Del Valle in October 2020 for consultation regarding change in her mammogram that showed an irregular area on the right breast that required biopsy.  Biopsy showed apical metaplasia.  Dr. Del Valle did tell her that this was a benign lesion and some people do choose to have it

## 2024-10-28 NOTE — PATIENT INSTRUCTIONS
Learning About Being Active as an Older Adult  Why is being active important as you get older?     Being active is one of the best things you can do for your health. And it's never too late to start. Being active--or getting active, if you aren't already--has definite benefits. It can:  Give you more energy,  Keep your mind sharp.  Improve balance to reduce your risk of falls.  Help you manage chronic illness with fewer medicines.  No matter how old you are, how fit you are, or what health problems you have, there is a form of activity that will work for you. And the more physical activity you can do, the better your overall health will be.  What kinds of activity can help you stay healthy?  Being more active will make your daily activities easier. Physical activity includes planned exercise and things you do in daily life. There are four types of activity:  Aerobic.  Doing aerobic activity makes your heart and lungs strong.  Includes walking, dancing, and gardening.  Aim for at least 2½ hours spread throughout the week.  It improves your energy and can help you sleep better.  Muscle-strengthening.  This type of activity can help maintain muscle and strengthen bones.  Includes climbing stairs, using resistance bands, and lifting or carrying heavy loads.  Aim for at least twice a week.  It can help protect the knees and other joints.  Stretching.  Stretching gives you better range of motion in joints and muscles.  Includes upper arm stretches, calf stretches, and gentle yoga.  Aim for at least twice a week, preferably after your muscles are warmed up from other activities.  It can help you function better in daily life.  Balancing.  This helps you stay coordinated and have good posture.  Includes heel-to-toe walking, cindy chi, and certain types of yoga.  Aim for at least 3 days a week.  It can reduce your risk of falling.  Even if you have a hard time meeting the recommendations, it's better to be more active

## 2024-10-30 DIAGNOSIS — E11.22 TYPE 2 DIABETES MELLITUS WITH STAGE 3A CHRONIC KIDNEY DISEASE, WITHOUT LONG-TERM CURRENT USE OF INSULIN (HCC): ICD-10-CM

## 2024-10-30 DIAGNOSIS — E55.9 VITAMIN D DEFICIENCY: ICD-10-CM

## 2024-10-30 DIAGNOSIS — E03.9 HYPOTHYROIDISM, UNSPECIFIED TYPE: Primary | ICD-10-CM

## 2024-10-30 DIAGNOSIS — E78.5 HYPERLIPIDEMIA, UNSPECIFIED HYPERLIPIDEMIA TYPE: ICD-10-CM

## 2024-10-30 DIAGNOSIS — N18.31 TYPE 2 DIABETES MELLITUS WITH STAGE 3A CHRONIC KIDNEY DISEASE, WITHOUT LONG-TERM CURRENT USE OF INSULIN (HCC): ICD-10-CM

## 2024-10-30 DIAGNOSIS — N18.31 STAGE 3A CHRONIC KIDNEY DISEASE (HCC): ICD-10-CM

## 2025-01-13 DIAGNOSIS — K21.00 GASTROESOPHAGEAL REFLUX DISEASE WITH ESOPHAGITIS WITHOUT HEMORRHAGE: ICD-10-CM

## 2025-01-13 NOTE — TELEPHONE ENCOUNTER
LOV 10/28/24   RTO 4/28/25  LRF 7/16/24          Controlled Substance Monitoring:    Acute and Chronic Pain Monitoring:        No data to display

## 2025-01-14 RX ORDER — PANTOPRAZOLE SODIUM 40 MG/1
TABLET, DELAYED RELEASE ORAL
Qty: 30 TABLET | Refills: 5 | Status: SHIPPED | OUTPATIENT
Start: 2025-01-14

## 2025-03-29 DIAGNOSIS — E03.9 HYPOTHYROIDISM, UNSPECIFIED TYPE: ICD-10-CM

## 2025-03-29 DIAGNOSIS — E78.5 HYPERLIPIDEMIA, UNSPECIFIED HYPERLIPIDEMIA TYPE: ICD-10-CM

## 2025-04-01 RX ORDER — LEVOTHYROXINE SODIUM 88 UG/1
88 TABLET ORAL DAILY
Qty: 90 TABLET | Refills: 1 | Status: SHIPPED | OUTPATIENT
Start: 2025-04-01

## 2025-04-01 RX ORDER — SIMVASTATIN 40 MG
40 TABLET ORAL NIGHTLY
Qty: 90 TABLET | Refills: 1 | Status: SHIPPED | OUTPATIENT
Start: 2025-04-01 | End: 2026-04-01

## 2025-04-10 DIAGNOSIS — R45.89 DEPRESSED MOOD: ICD-10-CM

## 2025-04-10 DIAGNOSIS — F41.9 ANXIETY: ICD-10-CM

## 2025-04-10 NOTE — TELEPHONE ENCOUNTER
LOV 10/28/24   RTO 4/28/25  LRF 10/15//24          Controlled Substance Monitoring:    Acute and Chronic Pain Monitoring:        No data to display

## 2025-04-11 RX ORDER — CITALOPRAM HYDROBROMIDE 10 MG/1
10 TABLET ORAL DAILY
Qty: 90 TABLET | Refills: 1 | Status: SHIPPED | OUTPATIENT
Start: 2025-04-11

## 2025-04-26 SDOH — ECONOMIC STABILITY: INCOME INSECURITY: IN THE LAST 12 MONTHS, WAS THERE A TIME WHEN YOU WERE NOT ABLE TO PAY THE MORTGAGE OR RENT ON TIME?: NO

## 2025-04-26 SDOH — ECONOMIC STABILITY: FOOD INSECURITY: WITHIN THE PAST 12 MONTHS, YOU WORRIED THAT YOUR FOOD WOULD RUN OUT BEFORE YOU GOT MONEY TO BUY MORE.: NEVER TRUE

## 2025-04-26 SDOH — ECONOMIC STABILITY: FOOD INSECURITY: WITHIN THE PAST 12 MONTHS, THE FOOD YOU BOUGHT JUST DIDN'T LAST AND YOU DIDN'T HAVE MONEY TO GET MORE.: NEVER TRUE

## 2025-04-26 SDOH — ECONOMIC STABILITY: TRANSPORTATION INSECURITY
IN THE PAST 12 MONTHS, HAS THE LACK OF TRANSPORTATION KEPT YOU FROM MEDICAL APPOINTMENTS OR FROM GETTING MEDICATIONS?: NO

## 2025-04-26 ASSESSMENT — PATIENT HEALTH QUESTIONNAIRE - PHQ9
2. FEELING DOWN, DEPRESSED OR HOPELESS: NOT AT ALL
SUM OF ALL RESPONSES TO PHQ QUESTIONS 1-9: 0
1. LITTLE INTEREST OR PLEASURE IN DOING THINGS: NOT AT ALL
SUM OF ALL RESPONSES TO PHQ QUESTIONS 1-9: 0
SUM OF ALL RESPONSES TO PHQ9 QUESTIONS 1 & 2: 0
1. LITTLE INTEREST OR PLEASURE IN DOING THINGS: NOT AT ALL
SUM OF ALL RESPONSES TO PHQ QUESTIONS 1-9: 0
2. FEELING DOWN, DEPRESSED OR HOPELESS: NOT AT ALL
SUM OF ALL RESPONSES TO PHQ QUESTIONS 1-9: 0

## 2025-04-28 ENCOUNTER — OFFICE VISIT (OUTPATIENT)
Dept: FAMILY MEDICINE CLINIC | Age: 75
End: 2025-04-28
Payer: MEDICARE

## 2025-04-28 VITALS
BODY MASS INDEX: 30.73 KG/M2 | HEIGHT: 62 IN | HEART RATE: 79 BPM | WEIGHT: 167 LBS | TEMPERATURE: 97.8 F | SYSTOLIC BLOOD PRESSURE: 136 MMHG | OXYGEN SATURATION: 98 % | DIASTOLIC BLOOD PRESSURE: 80 MMHG

## 2025-04-28 DIAGNOSIS — Z85.3 HISTORY OF BREAST CANCER IN FEMALE: ICD-10-CM

## 2025-04-28 DIAGNOSIS — M81.0 AGE-RELATED OSTEOPOROSIS WITHOUT CURRENT PATHOLOGICAL FRACTURE: ICD-10-CM

## 2025-04-28 DIAGNOSIS — K80.20 CALCULUS OF GALLBLADDER WITHOUT CHOLECYSTITIS WITHOUT OBSTRUCTION: ICD-10-CM

## 2025-04-28 DIAGNOSIS — Z90.12 HISTORY OF LEFT MASTECTOMY: ICD-10-CM

## 2025-04-28 DIAGNOSIS — K76.0 FATTY LIVER: ICD-10-CM

## 2025-04-28 DIAGNOSIS — E03.9 HYPOTHYROIDISM, UNSPECIFIED TYPE: Primary | ICD-10-CM

## 2025-04-28 DIAGNOSIS — G89.29 CHRONIC RIGHT SHOULDER PAIN: ICD-10-CM

## 2025-04-28 DIAGNOSIS — K21.00 GASTROESOPHAGEAL REFLUX DISEASE WITH ESOPHAGITIS WITHOUT HEMORRHAGE: ICD-10-CM

## 2025-04-28 DIAGNOSIS — I34.0 MILD MITRAL REGURGITATION BY PRIOR ECHOCARDIOGRAM: ICD-10-CM

## 2025-04-28 DIAGNOSIS — E11.22 TYPE 2 DIABETES MELLITUS WITH STAGE 3A CHRONIC KIDNEY DISEASE, WITHOUT LONG-TERM CURRENT USE OF INSULIN (HCC): ICD-10-CM

## 2025-04-28 DIAGNOSIS — N18.31 TYPE 2 DIABETES MELLITUS WITH STAGE 3A CHRONIC KIDNEY DISEASE, WITHOUT LONG-TERM CURRENT USE OF INSULIN (HCC): ICD-10-CM

## 2025-04-28 DIAGNOSIS — E55.9 VITAMIN D DEFICIENCY: ICD-10-CM

## 2025-04-28 DIAGNOSIS — R91.1 NODULE OF LEFT LUNG: ICD-10-CM

## 2025-04-28 DIAGNOSIS — J44.9 COPD, MILD (HCC): ICD-10-CM

## 2025-04-28 DIAGNOSIS — I25.10 MILD CORONARY ARTERY DISEASE: ICD-10-CM

## 2025-04-28 DIAGNOSIS — E66.09 CLASS 1 OBESITY DUE TO EXCESS CALORIES WITH SERIOUS COMORBIDITY AND BODY MASS INDEX (BMI) OF 30.0 TO 30.9 IN ADULT: ICD-10-CM

## 2025-04-28 DIAGNOSIS — E66.811 CLASS 1 OBESITY DUE TO EXCESS CALORIES WITH SERIOUS COMORBIDITY AND BODY MASS INDEX (BMI) OF 30.0 TO 30.9 IN ADULT: ICD-10-CM

## 2025-04-28 DIAGNOSIS — I51.89 DIASTOLIC DYSFUNCTION: ICD-10-CM

## 2025-04-28 DIAGNOSIS — R31.29 MICROHEMATURIA: ICD-10-CM

## 2025-04-28 DIAGNOSIS — Z87.891 PERSONAL HISTORY OF SMOKING: ICD-10-CM

## 2025-04-28 DIAGNOSIS — R45.89 DEPRESSED MOOD: ICD-10-CM

## 2025-04-28 DIAGNOSIS — N20.0 KIDNEY STONES: ICD-10-CM

## 2025-04-28 DIAGNOSIS — E78.5 HYPERLIPIDEMIA, UNSPECIFIED HYPERLIPIDEMIA TYPE: ICD-10-CM

## 2025-04-28 DIAGNOSIS — F41.9 ANXIETY: ICD-10-CM

## 2025-04-28 DIAGNOSIS — M25.511 CHRONIC RIGHT SHOULDER PAIN: ICD-10-CM

## 2025-04-28 DIAGNOSIS — R01.1 HEART MURMUR: ICD-10-CM

## 2025-04-28 DIAGNOSIS — N18.31 STAGE 3A CHRONIC KIDNEY DISEASE (HCC): ICD-10-CM

## 2025-04-28 PROCEDURE — 2022F DILAT RTA XM EVC RTNOPTHY: CPT | Performed by: PEDIATRICS

## 2025-04-28 PROCEDURE — 3017F COLORECTAL CA SCREEN DOC REV: CPT | Performed by: PEDIATRICS

## 2025-04-28 PROCEDURE — 1159F MED LIST DOCD IN RCRD: CPT | Performed by: PEDIATRICS

## 2025-04-28 PROCEDURE — 1036F TOBACCO NON-USER: CPT | Performed by: PEDIATRICS

## 2025-04-28 PROCEDURE — G8427 DOCREV CUR MEDS BY ELIG CLIN: HCPCS | Performed by: PEDIATRICS

## 2025-04-28 PROCEDURE — 3046F HEMOGLOBIN A1C LEVEL >9.0%: CPT | Performed by: PEDIATRICS

## 2025-04-28 PROCEDURE — 1090F PRES/ABSN URINE INCON ASSESS: CPT | Performed by: PEDIATRICS

## 2025-04-28 PROCEDURE — G8400 PT W/DXA NO RESULTS DOC: HCPCS | Performed by: PEDIATRICS

## 2025-04-28 PROCEDURE — 1160F RVW MEDS BY RX/DR IN RCRD: CPT | Performed by: PEDIATRICS

## 2025-04-28 PROCEDURE — 1123F ACP DISCUSS/DSCN MKR DOCD: CPT | Performed by: PEDIATRICS

## 2025-04-28 PROCEDURE — 3023F SPIROM DOC REV: CPT | Performed by: PEDIATRICS

## 2025-04-28 PROCEDURE — G8417 CALC BMI ABV UP PARAM F/U: HCPCS | Performed by: PEDIATRICS

## 2025-04-28 PROCEDURE — G2211 COMPLEX E/M VISIT ADD ON: HCPCS | Performed by: PEDIATRICS

## 2025-04-28 PROCEDURE — 99215 OFFICE O/P EST HI 40 MIN: CPT | Performed by: PEDIATRICS

## 2025-04-28 RX ORDER — ALBUTEROL SULFATE 90 UG/1
2 INHALANT RESPIRATORY (INHALATION) EVERY 4 HOURS PRN
Qty: 1 EACH | Refills: 1 | Status: SHIPPED | OUTPATIENT
Start: 2025-04-28 | End: 2026-04-28

## 2025-04-28 ASSESSMENT — ENCOUNTER SYMPTOMS
STRIDOR: 0
EYE DISCHARGE: 0
ABDOMINAL PAIN: 0
DIARRHEA: 0
RHINORRHEA: 0
TROUBLE SWALLOWING: 0
CONSTIPATION: 0
VOICE CHANGE: 0
EYE PAIN: 0
SHORTNESS OF BREATH: 1
ABDOMINAL DISTENTION: 0
COUGH: 0
SORE THROAT: 0
CHEST TIGHTNESS: 0
BACK PAIN: 0
BLOOD IN STOOL: 0
VOMITING: 0
COLOR CHANGE: 0
SINUS PRESSURE: 0
NAUSEA: 0
PHOTOPHOBIA: 0
EYE REDNESS: 0
WHEEZING: 0

## 2025-04-28 NOTE — PROGRESS NOTES
effort is normal. No respiratory distress.      Breath sounds: Normal breath sounds. No stridor. No decreased breath sounds, wheezing, rhonchi or rales.   Abdominal:      General: Bowel sounds are normal. There is no distension.      Palpations: Abdomen is soft. There is no mass.      Tenderness: There is no abdominal tenderness. There is no right CVA tenderness, left CVA tenderness or guarding.   Genitourinary:     Vagina: Normal.      Cervix: No cervical motion tenderness, discharge or friability.      Adnexa:         Right: No mass, tenderness or fullness.          Left: No mass, tenderness or fullness.     Musculoskeletal:      Right shoulder: Tenderness present. Decreased range of motion.      Cervical back: Normal range of motion and neck supple.      Right hip: Normal range of motion.      Left hip: Normal range of motion.      Right lower leg: No edema.      Left lower leg: No edema.   Feet:      Right foot:      Protective Sensation: 6 sites tested.  6 sites sensed.      Skin integrity: Skin integrity normal.      Toenail Condition: Right toenails are normal.      Left foot:      Protective Sensation: 6 sites tested.  6 sites sensed.      Skin integrity: Skin integrity normal.      Toenail Condition: Left toenails are normal.   Lymphadenopathy:      Cervical: No cervical adenopathy.   Skin:     General: Skin is warm.      Capillary Refill: Capillary refill takes less than 2 seconds.      Coloration: Skin is not pale.      Findings: No erythema or rash.   Neurological:      General: No focal deficit present.      Mental Status: She is alert and oriented to person, place, and time.      Cranial Nerves: No cranial nerve deficit.      Motor: No abnormal muscle tone.      Coordination: Coordination normal.      Gait: Gait normal.      Deep Tendon Reflexes: Reflexes are normal and symmetric.   Psychiatric:         Attention and Perception: Attention normal.         Mood and Affect: Mood is anxious (much improved)

## 2025-04-30 ENCOUNTER — HOSPITAL ENCOUNTER (OUTPATIENT)
Dept: GENERAL RADIOLOGY | Age: 75
Discharge: HOME | End: 2025-05-02
Payer: MEDICARE

## 2025-04-30 DIAGNOSIS — G89.29 CHRONIC RIGHT SHOULDER PAIN: ICD-10-CM

## 2025-04-30 DIAGNOSIS — M25.511 CHRONIC RIGHT SHOULDER PAIN: ICD-10-CM

## 2025-04-30 PROCEDURE — 73030 X-RAY EXAM OF SHOULDER: CPT

## 2025-05-04 ENCOUNTER — RESULTS FOLLOW-UP (OUTPATIENT)
Dept: FAMILY MEDICINE CLINIC | Age: 75
End: 2025-05-04

## 2025-05-19 ENCOUNTER — OFFICE VISIT (OUTPATIENT)
Dept: FAMILY MEDICINE CLINIC | Age: 75
End: 2025-05-19
Payer: MEDICARE

## 2025-05-19 VITALS
SYSTOLIC BLOOD PRESSURE: 124 MMHG | TEMPERATURE: 97.4 F | BODY MASS INDEX: 29.57 KG/M2 | DIASTOLIC BLOOD PRESSURE: 82 MMHG | WEIGHT: 163 LBS | HEART RATE: 67 BPM | OXYGEN SATURATION: 93 %

## 2025-05-19 DIAGNOSIS — J40 BRONCHITIS: Primary | ICD-10-CM

## 2025-05-19 PROCEDURE — 1123F ACP DISCUSS/DSCN MKR DOCD: CPT | Performed by: INTERNAL MEDICINE

## 2025-05-19 PROCEDURE — 99213 OFFICE O/P EST LOW 20 MIN: CPT | Performed by: INTERNAL MEDICINE

## 2025-05-19 PROCEDURE — 1159F MED LIST DOCD IN RCRD: CPT | Performed by: INTERNAL MEDICINE

## 2025-05-19 RX ORDER — PREDNISONE 20 MG/1
20 TABLET ORAL 2 TIMES DAILY
Qty: 10 TABLET | Refills: 0 | Status: SHIPPED | OUTPATIENT
Start: 2025-05-19 | End: 2025-05-24

## 2025-05-19 RX ORDER — GUAIFENESIN 600 MG/1
600 TABLET, EXTENDED RELEASE ORAL 2 TIMES DAILY
Qty: 14 TABLET | Refills: 0 | Status: SHIPPED | OUTPATIENT
Start: 2025-05-19 | End: 2025-05-26

## 2025-05-19 NOTE — PROGRESS NOTES
Dez Vail (:  1950) is a 74 y.o. female, here for evaluation of the following chief complaint(s):  Chief Complaint   Patient presents with    Cough          Subjective   Patient has been having cough chest congestion sore throat body aches for 4 days.  She took Mucinex which is helping but not getting symptoms better.  Patient also uses inhaler.  Non-smoker.  No fever or chills.        Review of Systems       Objective   Physical Exam  Constitutional:       Appearance: Normal appearance.   HENT:      Head: Normocephalic.      Ears:      Comments: Bulging tympanic membrane left more than right.     Mouth/Throat:      Mouth: Mucous membranes are moist.      Comments: Erythema of throat, hyperemic nasal turbinate.  Cardiovascular:      Rate and Rhythm: Normal rate and regular rhythm.   Pulmonary:      Comments: Decreased air movement.  No wheezing  Abdominal:      Palpations: Abdomen is soft.   Musculoskeletal:      Cervical back: Neck supple.   Skin:     General: Skin is warm.   Neurological:      Mental Status: She is alert and oriented to person, place, and time.   Psychiatric:         Mood and Affect: Mood normal.            Assessment & Plan     Diagnosis Orders   1. Bronchitis  amoxicillin-clavulanate (AUGMENTIN) 875-125 MG per tablet    predniSONE (DELTASONE) 20 MG tablet    guaiFENesin (MUCINEX) 600 MG extended release tablet    Will give short course of antibiotics steroids and symptomatic treatment.  Patient advised to continue inhaler                No follow-ups on file.       An electronic signature was used to authenticate this note.    --Elena Barragan MD

## 2025-06-27 ENCOUNTER — HOSPITAL ENCOUNTER (OUTPATIENT)
Dept: MAMMOGRAPHY | Age: 75
Discharge: HOME | End: 2025-06-29
Payer: MEDICARE

## 2025-06-27 ENCOUNTER — HOSPITAL ENCOUNTER (OUTPATIENT)
Dept: CT IMAGING | Age: 75
Discharge: HOME | End: 2025-06-29
Payer: MEDICARE

## 2025-06-27 VITALS — BODY MASS INDEX: 28.53 KG/M2 | HEIGHT: 63 IN | WEIGHT: 161 LBS

## 2025-06-27 DIAGNOSIS — Z12.31 SCREENING MAMMOGRAM FOR BREAST CANCER: ICD-10-CM

## 2025-06-27 DIAGNOSIS — Z87.891 FORMER SMOKER: ICD-10-CM

## 2025-06-27 DIAGNOSIS — Z90.12 HISTORY OF LEFT MASTECTOMY: ICD-10-CM

## 2025-06-27 PROCEDURE — 77063 BREAST TOMOSYNTHESIS BI: CPT

## 2025-06-27 PROCEDURE — 71271 CT THORAX LUNG CANCER SCR C-: CPT

## 2025-07-01 ENCOUNTER — RESULTS FOLLOW-UP (OUTPATIENT)
Dept: FAMILY MEDICINE CLINIC | Age: 75
End: 2025-07-01

## 2025-07-01 DIAGNOSIS — R91.1 LEFT UPPER LOBE PULMONARY NODULE: Primary | ICD-10-CM

## 2025-07-01 DIAGNOSIS — R91.8 MULTIPLE LUNG NODULES ON CT: ICD-10-CM

## 2025-07-14 DIAGNOSIS — E11.22 TYPE 2 DIABETES MELLITUS WITH STAGE 3A CHRONIC KIDNEY DISEASE, WITHOUT LONG-TERM CURRENT USE OF INSULIN (HCC): ICD-10-CM

## 2025-07-14 DIAGNOSIS — E11.9 NEW ONSET TYPE 2 DIABETES MELLITUS (HCC): ICD-10-CM

## 2025-07-14 DIAGNOSIS — N18.31 TYPE 2 DIABETES MELLITUS WITH STAGE 3A CHRONIC KIDNEY DISEASE, WITHOUT LONG-TERM CURRENT USE OF INSULIN (HCC): ICD-10-CM

## 2025-07-14 NOTE — TELEPHONE ENCOUNTER
----- Message from Jacoby FLOOD sent at 7/14/2025 11:40 AM EDT -----  Regarding: ECC Message to Provider  ECC Message to Provider    Relationship to Patient: Covered Entity / Jennifer     Additional  / Jennifer from [facility] pharmacy have a question about patient's medication , needs new prescription  test strips lancets and a meter we need new prescriptions--------------------------------------------------------------------------------------------------------------------------    Call Back Information: OK to leave message on voicemail  Preferred Call Back Number: Phone [phone]

## 2025-07-15 RX ORDER — GLUCOSAMINE HCL/CHONDROITIN SU 500-400 MG
CAPSULE ORAL
Qty: 100 STRIP | Refills: 3 | Status: SHIPPED | OUTPATIENT
Start: 2025-07-15

## 2025-07-15 RX ORDER — AVOBENZONE, HOMOSALATE, OCTISALATE, OCTOCRYLENE 30; 40; 45; 26 MG/ML; MG/ML; MG/ML; MG/ML
1 CREAM TOPICAL DAILY
Qty: 100 EACH | Refills: 5 | Status: SHIPPED | OUTPATIENT
Start: 2025-07-15

## 2025-07-15 RX ORDER — BLOOD-GLUCOSE METER
1 KIT MISCELLANEOUS DAILY
Qty: 1 KIT | Refills: 0 | Status: SHIPPED | OUTPATIENT
Start: 2025-07-15

## 2025-07-22 DIAGNOSIS — K21.00 GASTROESOPHAGEAL REFLUX DISEASE WITH ESOPHAGITIS WITHOUT HEMORRHAGE: ICD-10-CM

## 2025-07-23 NOTE — TELEPHONE ENCOUNTER
LOV 4/28/25   RTO 10/30/25  LRF 1/14/25          Controlled Substance Monitoring:    Acute and Chronic Pain Monitoring:        No data to display

## 2025-07-24 RX ORDER — PANTOPRAZOLE SODIUM 40 MG/1
TABLET, DELAYED RELEASE ORAL
Qty: 90 TABLET | Refills: 1 | Status: SHIPPED | OUTPATIENT
Start: 2025-07-24